# Patient Record
Sex: MALE | Race: WHITE | HISPANIC OR LATINO | Employment: STUDENT | URBAN - METROPOLITAN AREA
[De-identification: names, ages, dates, MRNs, and addresses within clinical notes are randomized per-mention and may not be internally consistent; named-entity substitution may affect disease eponyms.]

---

## 2024-01-31 ENCOUNTER — HOSPITAL ENCOUNTER (OUTPATIENT)
Dept: RADIOLOGY | Facility: MEDICAL CENTER | Age: 24
End: 2024-01-31
Attending: PHYSICIAN ASSISTANT
Payer: COMMERCIAL

## 2024-01-31 ENCOUNTER — OFFICE VISIT (OUTPATIENT)
Dept: URGENT CARE | Facility: PHYSICIAN GROUP | Age: 24
End: 2024-01-31
Payer: COMMERCIAL

## 2024-01-31 ENCOUNTER — HOSPITAL ENCOUNTER (INPATIENT)
Facility: MEDICAL CENTER | Age: 24
LOS: 5 days | DRG: 253 | End: 2024-02-05
Attending: EMERGENCY MEDICINE | Admitting: STUDENT IN AN ORGANIZED HEALTH CARE EDUCATION/TRAINING PROGRAM
Payer: COMMERCIAL

## 2024-01-31 VITALS
HEART RATE: 72 BPM | WEIGHT: 154.1 LBS | TEMPERATURE: 97.6 F | SYSTOLIC BLOOD PRESSURE: 118 MMHG | RESPIRATION RATE: 16 BRPM | BODY MASS INDEX: 25.67 KG/M2 | HEIGHT: 65 IN | OXYGEN SATURATION: 98 % | DIASTOLIC BLOOD PRESSURE: 68 MMHG

## 2024-01-31 DIAGNOSIS — I82.622 ARM DVT (DEEP VENOUS THROMBOEMBOLISM), ACUTE, LEFT (HCC): ICD-10-CM

## 2024-01-31 DIAGNOSIS — G89.18 POSTOPERATIVE PAIN: ICD-10-CM

## 2024-01-31 DIAGNOSIS — M79.89 LEFT ARM SWELLING: ICD-10-CM

## 2024-01-31 DIAGNOSIS — I87.1 VENOUS THORACIC OUTLET SYNDROME OF LEFT SUBCLAVIAN VEIN: ICD-10-CM

## 2024-01-31 DIAGNOSIS — I82.A12 ACUTE DEEP VEIN THROMBOSIS (DVT) OF AXILLARY VEIN OF LEFT UPPER EXTREMITY (HCC): ICD-10-CM

## 2024-01-31 DIAGNOSIS — G54.0 THORACIC OUTLET SYNDROME: ICD-10-CM

## 2024-01-31 LAB
ALBUMIN SERPL BCP-MCNC: 4.5 G/DL (ref 3.2–4.9)
ALBUMIN/GLOB SERPL: 1.3 G/DL
ALP SERPL-CCNC: 66 U/L (ref 30–99)
ALT SERPL-CCNC: 17 U/L (ref 2–50)
ANION GAP SERPL CALC-SCNC: 14 MMOL/L (ref 7–16)
APTT PPP: 39.9 SEC (ref 24.7–36)
AST SERPL-CCNC: 17 U/L (ref 12–45)
BASOPHILS # BLD AUTO: 0.5 % (ref 0–1.8)
BASOPHILS # BLD: 0.04 K/UL (ref 0–0.12)
BILIRUB SERPL-MCNC: 0.5 MG/DL (ref 0.1–1.5)
BUN SERPL-MCNC: 13 MG/DL (ref 8–22)
CALCIUM ALBUM COR SERPL-MCNC: 9.3 MG/DL (ref 8.5–10.5)
CALCIUM SERPL-MCNC: 9.7 MG/DL (ref 8.5–10.5)
CHLORIDE SERPL-SCNC: 101 MMOL/L (ref 96–112)
CO2 SERPL-SCNC: 24 MMOL/L (ref 20–33)
CREAT SERPL-MCNC: 1.13 MG/DL (ref 0.5–1.4)
EOSINOPHIL # BLD AUTO: 0.11 K/UL (ref 0–0.51)
EOSINOPHIL NFR BLD: 1.3 % (ref 0–6.9)
ERYTHROCYTE [DISTWIDTH] IN BLOOD BY AUTOMATED COUNT: 39.7 FL (ref 35.9–50)
GFR SERPLBLD CREATININE-BSD FMLA CKD-EPI: 93 ML/MIN/1.73 M 2
GLOBULIN SER CALC-MCNC: 3.5 G/DL (ref 1.9–3.5)
GLUCOSE SERPL-MCNC: 130 MG/DL (ref 65–99)
HCT VFR BLD AUTO: 47.6 % (ref 42–52)
HGB BLD-MCNC: 16.3 G/DL (ref 14–18)
IMM GRANULOCYTES # BLD AUTO: 0.03 K/UL (ref 0–0.11)
IMM GRANULOCYTES NFR BLD AUTO: 0.3 % (ref 0–0.9)
INR PPP: 1.06 (ref 0.87–1.13)
LYMPHOCYTES # BLD AUTO: 1.33 K/UL (ref 1–4.8)
LYMPHOCYTES NFR BLD: 15.2 % (ref 22–41)
MCH RBC QN AUTO: 28.8 PG (ref 27–33)
MCHC RBC AUTO-ENTMCNC: 34.2 G/DL (ref 32.3–36.5)
MCV RBC AUTO: 84.1 FL (ref 81.4–97.8)
MONOCYTES # BLD AUTO: 0.62 K/UL (ref 0–0.85)
MONOCYTES NFR BLD AUTO: 7.1 % (ref 0–13.4)
NEUTROPHILS # BLD AUTO: 6.63 K/UL (ref 1.82–7.42)
NEUTROPHILS NFR BLD: 75.6 % (ref 44–72)
NRBC # BLD AUTO: 0 K/UL
NRBC BLD-RTO: 0 /100 WBC (ref 0–0.2)
PLATELET # BLD AUTO: 301 K/UL (ref 164–446)
PMV BLD AUTO: 9.2 FL (ref 9–12.9)
POTASSIUM SERPL-SCNC: 3.7 MMOL/L (ref 3.6–5.5)
PROT SERPL-MCNC: 8 G/DL (ref 6–8.2)
PROTHROMBIN TIME: 13.9 SEC (ref 12–14.6)
RBC # BLD AUTO: 5.66 M/UL (ref 4.7–6.1)
SODIUM SERPL-SCNC: 139 MMOL/L (ref 135–145)
UFH PPP CHRO-ACNC: <0.1 IU/ML
WBC # BLD AUTO: 8.8 K/UL (ref 4.8–10.8)

## 2024-01-31 PROCEDURE — 80053 COMPREHEN METABOLIC PANEL: CPT

## 2024-01-31 PROCEDURE — 85025 COMPLETE CBC W/AUTO DIFF WBC: CPT

## 2024-01-31 PROCEDURE — 85613 RUSSELL VIPER VENOM DILUTED: CPT | Mod: 91

## 2024-01-31 PROCEDURE — 93971 EXTREMITY STUDY: CPT | Mod: LT

## 2024-01-31 PROCEDURE — 85670 THROMBIN TIME PLASMA: CPT | Mod: 91

## 2024-01-31 PROCEDURE — 85598 HEXAGNAL PHOSPH PLTLT NEUTRL: CPT

## 2024-01-31 PROCEDURE — 86146 BETA-2 GLYCOPROTEIN ANTIBODY: CPT

## 2024-01-31 PROCEDURE — 85732 THROMBOPLASTIN TIME PARTIAL: CPT

## 2024-01-31 PROCEDURE — 770020 HCHG ROOM/CARE - TELE (206)

## 2024-01-31 PROCEDURE — 85306 CLOT INHIBIT PROT S FREE: CPT

## 2024-01-31 PROCEDURE — 85610 PROTHROMBIN TIME: CPT

## 2024-01-31 PROCEDURE — 81241 F5 GENE: CPT

## 2024-01-31 PROCEDURE — 86147 CARDIOLIPIN ANTIBODY EA IG: CPT | Mod: 91

## 2024-01-31 PROCEDURE — 85730 THROMBOPLASTIN TIME PARTIAL: CPT

## 2024-01-31 PROCEDURE — 85520 HEPARIN ASSAY: CPT

## 2024-01-31 PROCEDURE — 700111 HCHG RX REV CODE 636 W/ 250 OVERRIDE (IP): Performed by: EMERGENCY MEDICINE

## 2024-01-31 PROCEDURE — 96365 THER/PROPH/DIAG IV INF INIT: CPT

## 2024-01-31 PROCEDURE — 81240 F2 GENE: CPT

## 2024-01-31 PROCEDURE — 3078F DIAST BP <80 MM HG: CPT | Performed by: PHYSICIAN ASSISTANT

## 2024-01-31 PROCEDURE — 36415 COLL VENOUS BLD VENIPUNCTURE: CPT

## 2024-01-31 PROCEDURE — 99205 OFFICE O/P NEW HI 60 MIN: CPT | Performed by: PHYSICIAN ASSISTANT

## 2024-01-31 PROCEDURE — 3074F SYST BP LT 130 MM HG: CPT | Performed by: PHYSICIAN ASSISTANT

## 2024-01-31 PROCEDURE — 96375 TX/PRO/DX INJ NEW DRUG ADDON: CPT

## 2024-01-31 PROCEDURE — 85303 CLOT INHIBIT PROT C ACTIVITY: CPT

## 2024-01-31 PROCEDURE — 99223 1ST HOSP IP/OBS HIGH 75: CPT | Mod: GC | Performed by: STUDENT IN AN ORGANIZED HEALTH CARE EDUCATION/TRAINING PROGRAM

## 2024-01-31 PROCEDURE — 99285 EMERGENCY DEPT VISIT HI MDM: CPT

## 2024-01-31 RX ORDER — HEPARIN SODIUM 5000 [USP'U]/100ML
0-30 INJECTION, SOLUTION INTRAVENOUS CONTINUOUS
Status: DISCONTINUED | OUTPATIENT
Start: 2024-01-31 | End: 2024-02-02

## 2024-01-31 RX ORDER — HEPARIN SODIUM 1000 [USP'U]/ML
80 INJECTION, SOLUTION INTRAVENOUS; SUBCUTANEOUS ONCE
Status: COMPLETED | OUTPATIENT
Start: 2024-01-31 | End: 2024-01-31

## 2024-01-31 RX ORDER — HEPARIN SODIUM 1000 [USP'U]/ML
40 INJECTION, SOLUTION INTRAVENOUS; SUBCUTANEOUS PRN
Status: DISCONTINUED | OUTPATIENT
Start: 2024-01-31 | End: 2024-02-02

## 2024-01-31 RX ADMIN — HEPARIN SODIUM 18 UNITS/KG/HR: 5000 INJECTION, SOLUTION INTRAVENOUS at 19:21

## 2024-01-31 RX ADMIN — HEPARIN SODIUM 5500 UNITS: 1000 INJECTION, SOLUTION INTRAVENOUS; SUBCUTANEOUS at 19:19

## 2024-01-31 ASSESSMENT — ENCOUNTER SYMPTOMS
SHORTNESS OF BREATH: 0
SHORTNESS OF BREATH: 0
PALPITATIONS: 0
COUGH: 0
DIARRHEA: 0
WEAKNESS: 0
FOCAL WEAKNESS: 0
DIZZINESS: 0
WEIGHT LOSS: 0
CHILLS: 0
ORTHOPNEA: 0
MYALGIAS: 0
TREMORS: 0
PALPITATIONS: 0
TINGLING: 0
SORE THROAT: 0
PSYCHIATRIC NEGATIVE: 1
HEMOPTYSIS: 0
SPUTUM PRODUCTION: 0
HEADACHES: 0
DIAPHORESIS: 0
DIZZINESS: 0
CHILLS: 0
CLAUDICATION: 0
BRUISES/BLEEDS EASILY: 0
FEVER: 0
NAUSEA: 0
ABDOMINAL PAIN: 0
FEVER: 0
MYALGIAS: 1
VOMITING: 0
SENSORY CHANGE: 0
NECK PAIN: 0
SINUS PAIN: 0
BLOOD IN STOOL: 0
TINGLING: 0
BLURRED VISION: 0

## 2024-01-31 ASSESSMENT — COGNITIVE AND FUNCTIONAL STATUS - GENERAL
DAILY ACTIVITIY SCORE: 24
SUGGESTED CMS G CODE MODIFIER DAILY ACTIVITY: CH
SUGGESTED CMS G CODE MODIFIER MOBILITY: CH
MOBILITY SCORE: 24

## 2024-01-31 ASSESSMENT — PATIENT HEALTH QUESTIONNAIRE - PHQ9
1. LITTLE INTEREST OR PLEASURE IN DOING THINGS: NOT AT ALL
2. FEELING DOWN, DEPRESSED, IRRITABLE, OR HOPELESS: NOT AT ALL
SUM OF ALL RESPONSES TO PHQ9 QUESTIONS 1 AND 2: 0

## 2024-01-31 ASSESSMENT — PAIN DESCRIPTION - PAIN TYPE
TYPE: ACUTE PAIN
TYPE: ACUTE PAIN

## 2024-01-31 ASSESSMENT — LIFESTYLE VARIABLES
ALCOHOL_USE: NO
AVERAGE NUMBER OF DAYS PER WEEK YOU HAVE A DRINK CONTAINING ALCOHOL: 0
CONSUMPTION TOTAL: NEGATIVE
TOTAL SCORE: 0
TOTAL SCORE: 0
ON A TYPICAL DAY WHEN YOU DRINK ALCOHOL HOW MANY DRINKS DO YOU HAVE: 0
EVER HAD A DRINK FIRST THING IN THE MORNING TO STEADY YOUR NERVES TO GET RID OF A HANGOVER: NO
EVER FELT BAD OR GUILTY ABOUT YOUR DRINKING: NO
HAVE PEOPLE ANNOYED YOU BY CRITICIZING YOUR DRINKING: NO
TOTAL SCORE: 0
HAVE YOU EVER FELT YOU SHOULD CUT DOWN ON YOUR DRINKING: NO
HOW MANY TIMES IN THE PAST YEAR HAVE YOU HAD 5 OR MORE DRINKS IN A DAY: 0
DOES PATIENT WANT TO STOP DRINKING: NO

## 2024-01-31 ASSESSMENT — FIBROSIS 4 INDEX: FIB4 SCORE: 0.32

## 2024-01-31 NOTE — PROGRESS NOTES
Subjective:     CHIEF COMPLAINT  Chief Complaint   Patient presents with    Arm Pain     Since Saturday he has been having pain in his left arm, is swollen, color difference, pain under his bicep to his arm pit, hurts to raise arm all the way,tender to the touch, hot, does lift weights, (mom did mentioned that father side of the family hx of blood clots)        ANN Storey is a very pleasant 23 y.o. male who presents to the clinic with atraumatic left arm pain x 5 days.  Patient states he has noted intermittent swelling of his left arm over the last 5 days.  Has also noted his left arm is slightly purple in color as compared to his right arm.  Experiencing a deep aching pain to the left brachial region.  Pain aggravated with overhead activity.  Denies any numbness or tingling distally.  Denies any associated headaches or dizziness.  States father has a history of blood clots.  Patient has never personally had a blood clot.  No recent surgery or travel.  No daily medications.  Denies any chest pain or shortness of breath.    REVIEW OF SYSTEMS  Review of Systems   Constitutional:  Negative for chills and fever.   Respiratory:  Negative for shortness of breath.    Cardiovascular:  Negative for chest pain and palpitations.   Musculoskeletal:  Positive for myalgias. Negative for neck pain.   Skin:         Purple discoloration to left arm.   Neurological:  Negative for dizziness, tingling, weakness and headaches.       PAST MEDICAL HISTORY  There are no problems to display for this patient.      SURGICAL HISTORY  patient denies any surgical history    ALLERGIES  Not on File    CURRENT MEDICATIONS  Home Medications       Reviewed by Rajiv Rivera P.A.-C. (Physician Assistant) on 01/31/24 at 1343  Med List Status: <None>     Medication Last Dose Status        Patient Onofre Taking any Medications                           SOCIAL HISTORY  Social History     Tobacco Use    Smoking status: Never    Smokeless  "tobacco: Never   Substance and Sexual Activity    Alcohol use: Not on file    Drug use: Not on file    Sexual activity: Not on file       FAMILY HISTORY  History reviewed. No pertinent family history.       Objective:     VITAL SIGNS: /68 (BP Location: Left arm, Patient Position: Sitting, BP Cuff Size: Adult)   Pulse 72   Temp 36.4 °C (97.6 °F) (Temporal)   Resp 16   Ht 1.651 m (5' 5\")   Wt 69.9 kg (154 lb 1.6 oz)   SpO2 98%   BMI 25.64 kg/m²     PHYSICAL EXAM  Physical Exam  Constitutional:       General: He is not in acute distress.     Appearance: Normal appearance. He is not ill-appearing, toxic-appearing or diaphoretic.   HENT:      Head: Normocephalic and atraumatic.   Eyes:      Conjunctiva/sclera: Conjunctivae normal.   Cardiovascular:      Rate and Rhythm: Normal rate and regular rhythm.      Pulses: Normal pulses. No decreased pulses.           Radial pulses are 2+ on the right side and 2+ on the left side.      Heart sounds: Normal heart sounds. No murmur heard.  Pulmonary:      Effort: Pulmonary effort is normal.   Musculoskeletal:      Cervical back: Normal range of motion.      Comments: Left arm: Left arm appears mildly edematous as compared to the right arm.  There is also a slightly purple discoloration present to left arm.  Capillary refill less than 2 seconds to all digits.  Radial pulses are palpable and 2+ bilaterally.  Mild tenderness to palpation deep over the brachial artery and vein.  Maintains full shoulder range of motion.  Full cervical range of motion.   Skin:     Capillary Refill: Capillary refill takes less than 2 seconds.   Neurological:      General: No focal deficit present.      Mental Status: He is alert and oriented to person, place, and time. Mental status is at baseline.       RADIOLOGY RESULTS   US-EXTREMITY VENOUS UPPER UNILAT LEFT    Result Date: 1/31/2024 1/31/2024 2:40 PM HISTORY/REASON FOR EXAM:  Swelling of Limb; Atraumatic left arm swelling and " discoloration.  Concern for potential DVT versus thoracic outlet syndrome versus subclavian steal syndrome TECHNIQUE/EXAM DESCRIPTION: Real-time sonography of the left upper extremity deep veins was performed with gray-scale graded compression, color and duplex Doppler. COMPARISON:  None. FINDINGS: REAL-TIME GRAY-SCALE IMAGING: Real-time gray-scale imaging reveals no evidence of focal wall thickening. COLOR AND DUPLEX DOPPLER IMAGING: Echogenic material in the subclavian, axillary, brachial and basilic veins with absent Doppler flow and abnormal response to compression.     1.  Extensive LEFT upper extremity DVT. 2.  Thrombosis of the basilic vein consistent with SVT. These findings were electronically conveyed to TAJ BAKER on 1/31/2024 3:20 PM.         Assessment/Plan:     1. Arm DVT (deep venous thromboembolism), acute, left (HCC)  US-EXTREMITY VENOUS UPPER UNILAT LEFT            MDM/Comments:    Very pleasant 23-year-old male presents to the clinic with left upper extremity pain, swelling and discoloration x 5 days.  No preceding injury or trauma.  On exam left arm is mildly edematous diffusely.  There is also a slight purple discoloration to the arm.  Radial pulses are 2+ and equal bilaterally.  Capillary refill less than 2 seconds.  Patient does demonstrate tenderness to palpation deep over the brachial artery and vein.  Maintains full shoulder range of motion.  Current differentials include but are not limited to DVT versus thoracic outlet syndrome versus subclavian steal syndrome.  We will send out for an outpatient ultrasound and I will follow-up once available.    Patient's left upper extremity ultrasound came back demonstrating a large DVT extending from his brachial vein to his subclavian vein.  Also superficial involvement of the basilic vein present.  Due to the extensive nature, acute onset and location we will have the patient follow-up in the ED for further management.  Patient may be a  candidate for thrombolytics as well as anticoagulation.  Transfer center was informed and the patient will arrive via personal vehicle.    Differential diagnosis, natural history, supportive care, and indications for immediate follow-up discussed. All questions answered. Patient agrees with the plan of care.    Follow-up as needed if symptoms worsen or fail to improve to PCP, Urgent care or Emergency Room.    I have personally reviewed prior external notes and test results pertinent to today's visit.  I have independently reviewed and interpreted all diagnostics ordered during this urgent care acute visit.   Discussed management options (risks,benefits, and alternatives to treatment). Pt expresses understanding and the treatment plan was agreed upon. Questions were encouraged and answered to pt's satisfaction.    Please note that this dictation was created using voice recognition software. I have made a reasonable attempt to correct obvious errors, but I expect that there are errors of grammar and possibly content that I did not discover before finalizing the note.

## 2024-02-01 ENCOUNTER — APPOINTMENT (OUTPATIENT)
Dept: RADIOLOGY | Facility: MEDICAL CENTER | Age: 24
DRG: 253 | End: 2024-02-01
Attending: SURGERY
Payer: COMMERCIAL

## 2024-02-01 ENCOUNTER — APPOINTMENT (OUTPATIENT)
Dept: RADIOLOGY | Facility: MEDICAL CENTER | Age: 24
DRG: 253 | End: 2024-02-01
Payer: COMMERCIAL

## 2024-02-01 LAB
ANION GAP SERPL CALC-SCNC: 14 MMOL/L (ref 7–16)
BUN SERPL-MCNC: 12 MG/DL (ref 8–22)
CALCIUM SERPL-MCNC: 9.3 MG/DL (ref 8.5–10.5)
CFT BLD TEG: 11.8 MIN (ref 4.6–9.1)
CFT P HPASE BLD TEG: 8.1 MIN (ref 4.3–8.3)
CHLORIDE SERPL-SCNC: 103 MMOL/L (ref 96–112)
CLOT ANGLE BLD TEG: 44.8 DEGREES (ref 63–78)
CLOT LYSIS 30M P MA LENFR BLD TEG: 0.2 % (ref 0–2.6)
CO2 SERPL-SCNC: 25 MMOL/L (ref 20–33)
CREAT SERPL-MCNC: 1.04 MG/DL (ref 0.5–1.4)
CT.EXTRINSIC BLD ROTEM: >5 MIN (ref 0.8–2.1)
ERYTHROCYTE [DISTWIDTH] IN BLOOD BY AUTOMATED COUNT: 39.3 FL (ref 35.9–50)
GFR SERPLBLD CREATININE-BSD FMLA CKD-EPI: 103 ML/MIN/1.73 M 2
GLUCOSE SERPL-MCNC: 97 MG/DL (ref 65–99)
HCT VFR BLD AUTO: 47.8 % (ref 42–52)
HGB BLD-MCNC: 16.7 G/DL (ref 14–18)
MCF BLD TEG: 47.3 MM (ref 52–69)
MCF.PLATELET INHIB BLD ROTEM: 19.5 MM (ref 15–32)
MCH RBC QN AUTO: 29.2 PG (ref 27–33)
MCHC RBC AUTO-ENTMCNC: 34.9 G/DL (ref 32.3–36.5)
MCV RBC AUTO: 83.7 FL (ref 81.4–97.8)
PA AA BLD-ACNC: 7 % (ref 0–11)
PA ADP BLD-ACNC: 0.6 % (ref 0–17)
PLATELET # BLD AUTO: 320 K/UL (ref 164–446)
PMV BLD AUTO: 10 FL (ref 9–12.9)
POTASSIUM SERPL-SCNC: 3.5 MMOL/L (ref 3.6–5.5)
RBC # BLD AUTO: 5.71 M/UL (ref 4.7–6.1)
SODIUM SERPL-SCNC: 142 MMOL/L (ref 135–145)
TEG ALGORITHM TGALG: ABNORMAL
UFH PPP CHRO-ACNC: 0.42 IU/ML
UFH PPP CHRO-ACNC: 0.45 IU/ML
WBC # BLD AUTO: 7.9 K/UL (ref 4.8–10.8)

## 2024-02-01 PROCEDURE — 85520 HEPARIN ASSAY: CPT

## 2024-02-01 PROCEDURE — 770020 HCHG ROOM/CARE - TELE (206)

## 2024-02-01 PROCEDURE — A9270 NON-COVERED ITEM OR SERVICE: HCPCS

## 2024-02-01 PROCEDURE — 85027 COMPLETE CBC AUTOMATED: CPT

## 2024-02-01 PROCEDURE — A9270 NON-COVERED ITEM OR SERVICE: HCPCS | Mod: JZ

## 2024-02-01 PROCEDURE — 85384 FIBRINOGEN ACTIVITY: CPT

## 2024-02-01 PROCEDURE — 85576 BLOOD PLATELET AGGREGATION: CPT | Mod: 91

## 2024-02-01 PROCEDURE — 71045 X-RAY EXAM CHEST 1 VIEW: CPT

## 2024-02-01 PROCEDURE — 700102 HCHG RX REV CODE 250 W/ 637 OVERRIDE(OP)

## 2024-02-01 PROCEDURE — 700111 HCHG RX REV CODE 636 W/ 250 OVERRIDE (IP): Performed by: EMERGENCY MEDICINE

## 2024-02-01 PROCEDURE — 99233 SBSQ HOSP IP/OBS HIGH 50: CPT | Mod: GC | Performed by: HOSPITALIST

## 2024-02-01 PROCEDURE — 700102 HCHG RX REV CODE 250 W/ 637 OVERRIDE(OP): Mod: JZ

## 2024-02-01 PROCEDURE — 700117 HCHG RX CONTRAST REV CODE 255: Performed by: SURGERY

## 2024-02-01 PROCEDURE — 80048 BASIC METABOLIC PNL TOTAL CA: CPT

## 2024-02-01 PROCEDURE — 36415 COLL VENOUS BLD VENIPUNCTURE: CPT

## 2024-02-01 PROCEDURE — 99221 1ST HOSP IP/OBS SF/LOW 40: CPT | Performed by: SURGERY

## 2024-02-01 PROCEDURE — 71275 CT ANGIOGRAPHY CHEST: CPT

## 2024-02-01 PROCEDURE — 85347 COAGULATION TIME ACTIVATED: CPT

## 2024-02-01 RX ORDER — POTASSIUM CHLORIDE 20 MEQ/1
40 TABLET, EXTENDED RELEASE ORAL 2 TIMES DAILY
Status: COMPLETED | OUTPATIENT
Start: 2024-02-01 | End: 2024-02-01

## 2024-02-01 RX ORDER — HYDROMORPHONE HYDROCHLORIDE 1 MG/ML
0.5 INJECTION, SOLUTION INTRAMUSCULAR; INTRAVENOUS; SUBCUTANEOUS
Status: DISCONTINUED | OUTPATIENT
Start: 2024-02-01 | End: 2024-02-02

## 2024-02-01 RX ORDER — ACETAMINOPHEN 500 MG
1000 TABLET ORAL EVERY 6 HOURS
Status: DISCONTINUED | OUTPATIENT
Start: 2024-02-01 | End: 2024-02-02

## 2024-02-01 RX ORDER — OXYCODONE HYDROCHLORIDE 5 MG/1
5 TABLET ORAL
Status: DISCONTINUED | OUTPATIENT
Start: 2024-02-01 | End: 2024-02-02

## 2024-02-01 RX ORDER — ACETAMINOPHEN 500 MG
1000 TABLET ORAL EVERY 6 HOURS PRN
Status: DISCONTINUED | OUTPATIENT
Start: 2024-02-06 | End: 2024-02-02

## 2024-02-01 RX ORDER — OXYCODONE HYDROCHLORIDE 10 MG/1
10 TABLET ORAL
Status: DISCONTINUED | OUTPATIENT
Start: 2024-02-01 | End: 2024-02-02

## 2024-02-01 RX ADMIN — ACETAMINOPHEN 1000 MG: 500 TABLET ORAL at 17:13

## 2024-02-01 RX ADMIN — POTASSIUM CHLORIDE 40 MEQ: 1500 TABLET, EXTENDED RELEASE ORAL at 17:13

## 2024-02-01 RX ADMIN — POTASSIUM CHLORIDE 40 MEQ: 1500 TABLET, EXTENDED RELEASE ORAL at 09:05

## 2024-02-01 RX ADMIN — IOHEXOL 90 ML: 350 INJECTION, SOLUTION INTRAVENOUS at 20:25

## 2024-02-01 RX ADMIN — ACETAMINOPHEN 1000 MG: 500 TABLET ORAL at 23:49

## 2024-02-01 RX ADMIN — HEPARIN SODIUM 18 UNITS/KG/HR: 5000 INJECTION, SOLUTION INTRAVENOUS at 15:24

## 2024-02-01 ASSESSMENT — ENCOUNTER SYMPTOMS
NAUSEA: 0
MYALGIAS: 0
BLURRED VISION: 0
HEMOPTYSIS: 0
DIARRHEA: 0
VOMITING: 0
NEUROLOGICAL NEGATIVE: 1
WEIGHT LOSS: 0
GASTROINTESTINAL NEGATIVE: 1
NECK PAIN: 0
PSYCHIATRIC NEGATIVE: 1
PALPITATIONS: 0
CHILLS: 0
CONSTIPATION: 0
BRUISES/BLEEDS EASILY: 0
ABDOMINAL PAIN: 0
FEVER: 0
DOUBLE VISION: 0
EYES NEGATIVE: 1
SHORTNESS OF BREATH: 0
COUGH: 0
HEARTBURN: 0

## 2024-02-01 ASSESSMENT — PAIN DESCRIPTION - PAIN TYPE
TYPE: ACUTE PAIN

## 2024-02-01 ASSESSMENT — FIBROSIS 4 INDEX: FIB4 SCORE: 0.3

## 2024-02-01 NOTE — PROGRESS NOTES
"  Progress note    HPI: Ha Storey is a 23 y.o. male admitted 2/1/2024 for worsening left arm swelling with mild pain in the setting of ultrasound positive for extensive left upper extremity deep vein thrombosis and superficial vein thrombosis of the basilic vein apparently stable.  Pertinent medical history includes \"clotting disorders\" on father side and weightlifting.  No apparent trauma, no hypertension, no immobility, no weight loss or any other symptoms that would suggest malignancy, no previous surgeries or PICC lines.  Admitted for higher level of care considering possible thrombectomy/thrombolysis and/or congenital coagulation disease.  Put on acute management with heparin, workup ordered.    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  Options include conservative therapy with simple anticoagulation and compression of the arm for indefinite time or surgical approach including venous thrombectomy with posterior first rib resection done during this admission.  Today, no acute changes.  Patient will discuss options with family and decide.  For now, will continue systemic anticoagulation and order thromboelastography.    Current Facility-Administered Medications:     Pharmacy Consult Request ...Pain Management Review 1 Each, 1 Each, Other, PHARMACY TO DOSE, Eileen Neely M.D.    acetaminophen (Tylenol) tablet 1,000 mg, 1,000 mg, Oral, Q6HRS **FOLLOWED BY** [START ON 2/6/2024] acetaminophen (Tylenol) tablet 1,000 mg, 1,000 mg, Oral, Q6HRS PRN, Eileen Neely M.D.    oxyCODONE immediate-release (Roxicodone) tablet 5 mg, 5 mg, Oral, Q3HRS PRN **OR** oxyCODONE immediate release (Roxicodone) tablet 10 mg, 10 mg, Oral, Q3HRS PRN **OR** HYDROmorphone (Dilaudid) injection 0.5 mg, 0.5 mg, Intravenous, Q3HRS PRN, Eileen Neely M.D.    potassium chloride SA (Kdur) tablet 40 mEq, 40 mEq, Oral, BID, Nico GARRETT" Reyes Yparraguirre, M.D.    heparin infusion 25,000 units in 500 mL 0.45% NACL, 0-30 Units/kg/hr, Intravenous, Continuous, Leigh Ann Hernandez M.D., Last Rate: 24.9 mL/hr at 02/01/24 0704, 18 Units/kg/hr at 02/01/24 0704    heparin injection 2,800 Units, 40 Units/kg, Intravenous, PRN, Leigh Ann Hernandez M.D.   Patient has no known allergies.     Vitals:    02/01/24 0122 02/01/24 0439 02/01/24 0529 02/01/24 0745   BP: 126/76 107/67  109/65   Pulse: 100 64  65   Resp: 18 17  18   Temp: 36.6 °C (97.9 °F) 36.5 °C (97.7 °F)  36.5 °C (97.7 °F)   TempSrc: Temporal Temporal  Temporal   SpO2: 97% 96%  96%   Weight:   67.7 kg (149 lb 4 oz)    Height:         Body mass index is 24.84 kg/m².    Review of Systems   Constitutional:  Negative for chills, fever and weight loss.   HENT: Negative.  Negative for hearing loss.    Eyes: Negative.  Negative for blurred vision and double vision.   Respiratory:  Negative for cough and hemoptysis.    Cardiovascular:  Negative for chest pain and palpitations.   Gastrointestinal: Negative.  Negative for heartburn.   Genitourinary: Negative.  Negative for dysuria.   Musculoskeletal:  Negative for myalgias and neck pain.   Skin: Negative.  Negative for itching and rash.   Neurological: Negative.    Endo/Heme/Allergies:  Does not bruise/bleed easily.   Psychiatric/Behavioral: Negative.        Physical Exam  Vitals and nursing note reviewed.   Constitutional:       Appearance: He is normal weight.   Cardiovascular:      Rate and Rhythm: Normal rate and regular rhythm.      Pulses: Normal pulses.      Heart sounds: Normal heart sounds.   Pulmonary:      Effort: Pulmonary effort is normal.   Abdominal:      General: Bowel sounds are normal.   Musculoskeletal:         General: Normal range of motion.      Cervical back: Normal range of motion. No rigidity.      Comments: Mildly tender mass palpated in left axilla with no coloration changes in left upper extremity.   Lymphadenopathy:      Cervical: No  cervical adenopathy.   Skin:     General: Skin is warm.      Capillary Refill: Capillary refill takes less than 2 seconds.   Neurological:      Mental Status: He is alert. Mental status is at baseline.   Psychiatric:         Mood and Affect: Mood normal.         Behavior: Behavior normal.         Thought Content: Thought content normal.         Judgment: Judgment normal.       LABS:   Recent Labs     24  1743 24  0125   WBC 8.8 7.9   RBC 5.66 5.71   HEMOGLOBIN 16.3 16.7   HEMATOCRIT 47.6 47.8   MCV 84.1 83.7   MCH 28.8 29.2   MCHC 34.2 34.9   RDW 39.7 39.3   PLATELETCT 301 320   MPV 9.2 10.0      Recent Labs     24  1743 24  0125   SODIUM 139 142   POTASSIUM 3.7 3.5*   CHLORIDE 101 103   CO2 24 25   GLUCOSE 130* 97   BUN 13 12   CREATININE 1.13 1.04   CALCIUM 9.7 9.3      Recent Labs     24  1743 24  0125   ALTSGPT 17  --    ASTSGOT 17  --    ALKPHOSPHAT 66  --    TBILIRUBIN 0.5  --    GLUCOSE 130* 97      RADIOLOGY:   No orders to display      MEDICAL DECISION MAKIN-year-old left handed male admitted 2024 for worsening left arm swelling with mild pain in axilla in the setting of apparently stable extensive left upper extremity deep vein thrombosis with superficial vein thrombosis of the basilic vein.  Pending workup for clotting disorders considering family history.  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  P patient will decide between conservative or surgical management.  Unable to anticipate discharge due to unclear intervention to be performed.    Disposition: Probably home, but will need to reassess.  Follow up outpatient with primary care physician, vascular surgery and other specialties already following.     ASSESSMENT AND PLAN:   * Extensive left upper extremity deep vein thrombosis with superficial vein thrombosis of basilic vein, unclear if nontraumatic or traumatic.   Pending workup for clotting disorders family history but favoring Paget Robles disease per vascular surgery recommendation.  Patient to decide between conservative treatment and surgery.  - Continue heparin drip  - Pending platelet mapping with basic thromboelastography  - Pending Antithrombin 3, heparin anti-Xa and lupus anticoagulant    * DVT prophylaxis with therapeutic heparin for main problem     Armando R. Reyes Yparraguirre, M.D.  Internal Medicine Resident   [facility]      This note was generated using voice recognition software which has a small chance of producing errors of grammar and possibly content. I have made every reasonable attempt to find and correct any obvious errors but expect that some may not be found prior to finalization of this note.

## 2024-02-01 NOTE — ED NOTES
Floor RN at bedside; verified rate of heparin drip; to floor via gurney; AOX4 GCS15 on room air; parents with patient. All belongings with patient

## 2024-02-01 NOTE — ED PROVIDER NOTES
"  ER Provider Note    Scribed for Leigh Ann Hernandez M.d. by Anh John. 1/31/2024  5:56 PM    Primary Care Provider: Pcp Pt States None    CHIEF COMPLAINT  Chief Complaint   Patient presents with    Sent from Urgent Care     Pt presented to  for \"warm/sharp\" L arm pain. Pt rates pain 4/10. Pt had US performed and found extensive DVT in TIANA.      LIMITATION TO HISTORY   Select: : None    HPI/ROS  OUTSIDE HISTORIAN(S):  None    EXTERNAL RECORDS REVIEWED  Hospital records reviewed showed an ultrasound done at Urgent Care today. This revealed echogenic materail in the subclavian, axillary, brachal and basilic veins with asent doppler flow and abnormal response to compression.     Ha Storey is a 23 y.o. male who presents to the ED for a a DVT in his left upper extremity. Starting on 1/27/24, the patient started to develop some pain under his left armpit with intermittent swelling. He rates his pain as a 4/10 in severity. When his left arm is down he does not have much pain but the pain is exacerbated when he moves his arm. Denies chest pain or shortness of breath. Patient was seen at Urgent Care today for left arm pain where an ultrasound found an extensive DVT in the left upper extremity. He adds that his dad's side of the family has a history of blood clots. Denies any trauma to the area, surgeries or periods of immobility. The patient does lift weights. Denies other medical problems.     PAST MEDICAL HISTORY  History reviewed. No pertinent past medical history.    SURGICAL HISTORY  History reviewed. No pertinent surgical history.    FAMILY HISTORY  History reviewed. No pertinent family history.    SOCIAL HISTORY   reports that he has never smoked. He has never used smokeless tobacco.    CURRENT MEDICATIONS  No current outpatient medications     ALLERGIES  Patient has no known allergies.    PHYSICAL EXAM  BP (!) 142/73   Pulse 76   Temp 36.7 °C (98 °F) (Temporal)   Resp 14   Ht 1.651 m (5' 5\")   Wt 69.3 " kg (152 lb 12.5 oz)   SpO2 97%   BMI 25.42 kg/m²     Constitutional: Alert in no apparent distress. Well apearing  HENT: Normocephalic, Atraumatic, Bilateral external ears normal. Nose normal.   Eyes:  Conjunctiva normal, non-icteric.   Lungs: Non-labored respirations, clear to auscultation bilaterally  Heart: Regular rate and rhythm no murmurs rubs or gallops  Skin: Warm, Dry, No erythema, No rash.   Neurologic: Alert, Grossly non-focal.   Psychiatric: Affect normal, Judgment normal, Mood normal, Appears appropriate and not intoxicated.   Extremities: Non pitting edema to the left upper extremity, Intact radial pulses.       DIAGNOSTIC STUDIES & PROCEDURES    Labs:   Results for orders placed or performed during the hospital encounter of 01/31/24   CBC WITH DIFFERENTIAL   Result Value Ref Range    WBC 8.8 4.8 - 10.8 K/uL    RBC 5.66 4.70 - 6.10 M/uL    Hemoglobin 16.3 14.0 - 18.0 g/dL    Hematocrit 47.6 42.0 - 52.0 %    MCV 84.1 81.4 - 97.8 fL    MCH 28.8 27.0 - 33.0 pg    MCHC 34.2 32.3 - 36.5 g/dL    RDW 39.7 35.9 - 50.0 fL    Platelet Count 301 164 - 446 K/uL    MPV 9.2 9.0 - 12.9 fL    Neutrophils-Polys 75.60 (H) 44.00 - 72.00 %    Lymphocytes 15.20 (L) 22.00 - 41.00 %    Monocytes 7.10 0.00 - 13.40 %    Eosinophils 1.30 0.00 - 6.90 %    Basophils 0.50 0.00 - 1.80 %    Immature Granulocytes 0.30 0.00 - 0.90 %    Nucleated RBC 0.00 0.00 - 0.20 /100 WBC    Neutrophils (Absolute) 6.63 1.82 - 7.42 K/uL    Lymphs (Absolute) 1.33 1.00 - 4.80 K/uL    Monos (Absolute) 0.62 0.00 - 0.85 K/uL    Eos (Absolute) 0.11 0.00 - 0.51 K/uL    Baso (Absolute) 0.04 0.00 - 0.12 K/uL    Immature Granulocytes (abs) 0.03 0.00 - 0.11 K/uL    NRBC (Absolute) 0.00 K/uL   COMP METABOLIC PANEL   Result Value Ref Range    Sodium 139 135 - 145 mmol/L    Potassium 3.7 3.6 - 5.5 mmol/L    Chloride 101 96 - 112 mmol/L    Co2 24 20 - 33 mmol/L    Anion Gap 14.0 7.0 - 16.0    Glucose 130 (H) 65 - 99 mg/dL    Bun 13 8 - 22 mg/dL    Creatinine  1.13 0.50 - 1.40 mg/dL    Calcium 9.7 8.5 - 10.5 mg/dL    Correct Calcium 9.3 8.5 - 10.5 mg/dL    AST(SGOT) 17 12 - 45 U/L    ALT(SGPT) 17 2 - 50 U/L    Alkaline Phosphatase 66 30 - 99 U/L    Total Bilirubin 0.5 0.1 - 1.5 mg/dL    Albumin 4.5 3.2 - 4.9 g/dL    Total Protein 8.0 6.0 - 8.2 g/dL    Globulin 3.5 1.9 - 3.5 g/dL    A-G Ratio 1.3 g/dL   APTT   Result Value Ref Range    APTT 39.9 (H) 24.7 - 36.0 sec   PROTHROMBIN TIME (INR)   Result Value Ref Range    PT 13.9 12.0 - 14.6 sec    INR 1.06 0.87 - 1.13   ESTIMATED GFR   Result Value Ref Range    GFR (CKD-EPI) 93 >60 mL/min/1.73 m 2      All labs reviewed by me.    COURSE & MEDICAL DECISION MAKING    ED Observation Status? No; Patient does not meet criteria for ED Observation.     5:56 PM - Patient seen and evaluated at bedside. Patient will be treated with heparin infusion 25, 000 units in 500 mL 0.45% NACL, heparin injection 2,800 units, heparin injection 5,5000 units for his symptoms. Ordered Prothrombin Time, CMP, APTT, and CBC w/ Diff,  to evaluate. He understands and agrees to the plan of care.     6:17 PM - Ordered Heparin Xa (Unfractionated), Antithrombin III Func/ Immunol, Anticardiolipin Antibody, Protein C Functional, Protein S Functional, Factor V Leiden Mutation, and Lupus Anticoagulant.     6:28 PM I discussed the patient's case and the above findings with Dr. Kaur (Vascular) who recommended that the patient be hospitalized and he will consult in the morning. He is concerned for thoracic outlet syndrome.     6:47 PM - I discussed the patient's case and the above findings with Dr. Quezada (Hospitalist) who agreed to hospitalize the patient. Patient's care was transferred at this time.     INITIAL ASSESSMENT AND PLAN  Care Narrative: This is a 23-year-old gentleman who presents for extensive DVT in the left upper extremity.  Patient has no tachycardia no hypoxia concerning for acute PE.  There is family history of DVTs it sounds like in the patient's  father's family.  Initially plan was to anticoagulate him with oral anticoagulants however on review review of his outpatient ultrasound he has such extensive disease that I did consult vascular surgery who recommended patient be hospitalized and he will likely plan on lysis of this and possible first rib removal as the patient likely has thoracic outlet syndrome.  Therefore patient will be started on heparin drip the hospitalist will admit the patient and patient will be hospitalized for definitive management of this.                     DISPOSITION AND DISCUSSIONS  I have discussed management of the patient with the following physicians and BABITA's: Dr. Kaur (Vascular), Dr. Quezada  (Hospitalist)    Discussion of management with other [facility] or appropriate source(s): None         DISPOSITION:  Patient will be hospitalized by Dr. Quezada (Hospitalist) in guarded condition.     FINAL IMPRESSION   1. Acute deep vein thrombosis (DVT) of axillary vein of left upper extremity (HCC)    2. Thoracic outlet syndrome      Anh VILLA (Larry), am scribing for, and in the presence of, Leigh Ann Hernandez M.D..    Electronically signed by: Anh John (Larry), 1/31/2024    ILeigh Ann M.D. personally performed the services described in this documentation, as scribed by Anh John in my presence, and it is both accurate and complete.    The note accurately reflects work and decisions made by me.  Leigh Ann Hernandez M.D.  1/31/2024  8:19 PM

## 2024-02-01 NOTE — CARE PLAN
The patient is Stable - Low risk of patient condition declining or worsening    Shift Goals  Clinical Goals: monitor heparin drip  Patient Goals: rest    Progress made toward(s) clinical / shift goals:    Problem: Knowledge Deficit - Standard  Goal: Patient and family/care givers will demonstrate understanding of plan of care, disease process/condition, diagnostic tests and medications  Outcome: Progressing  Note: Reviewed plan of care with patient.  Patient verbalizes understanding.      Problem: Risk for Bleeding  Goal: Patient will take measures to prevent bleeding and recognizes signs of bleeding that need to be reported immediately to a health care professional  Outcome: Progressing  Note: Education provided to patient regarding signs of bleeding that could arise from Heparin drip. Patient verbalized understanding of reporting any signs of bleeding to provider.

## 2024-02-01 NOTE — CONSULTS
Vascular Surgery          New Patient Consultation    Patient:Ha Storey  MRN:[id number]    Date: 2/1/2024    Referring Provider: SHIRLEY Trevizo M.d.    Consulting Physician: Venkata Kaur MD  _____________________________________________________    Reason for consultation:    Evaluate patient with left upper extremity deep vein thrombosis  HPI:  This is a 23 y.o. male who presented to the emergency room with a 4-day history of swelling of his left arm.  Patient reports some pain in the supraclavicular region earlier in the week.  The patient has had progressive swelling.  Patient underwent evaluation and has a left upper extremity deep vein thrombosis.  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    History reviewed. No pertinent past medical history.    History reviewed. No pertinent surgical history.    Current Facility-Administered Medications   Medication Dose Route Frequency Provider Last Rate Last Admin    Pharmacy Consult Request ...Pain Management Review 1 Each  1 Each Other PHARMACY TO DOSE Eileen Neely M.D.        acetaminophen (Tylenol) tablet 1,000 mg  1,000 mg Oral Q6HRS Eileen Neely M.D.        Followed by    [START ON 2/6/2024] acetaminophen (Tylenol) tablet 1,000 mg  1,000 mg Oral Q6HRS PRN Eileen Neely M.D.        oxyCODONE immediate-release (Roxicodone) tablet 5 mg  5 mg Oral Q3HRS PRTAYLOR Neely M.D.        Or    oxyCODONE immediate release (Roxicodone) tablet 10 mg  10 mg Oral Q3HRS PRTAYLOR Neely M.D.        Or    HYDROmorphone (Dilaudid) injection 0.5 mg  0.5 mg Intravenous Q3HRS PRN Eileen Neely M.D.        potassium chloride SA (Kdur) tablet 40 mEq  40 mEq Oral BID Armando R Reyes Yparraguirre, M.D.   40 mEq at 02/01/24 0905    heparin infusion 25,000 units in 500 mL 0.45% NACL  0-30 Units/kg/hr Intravenous Continuous Leigh Ann Hernandez M.D. 24.9 mL/hr at 02/01/24 0903 18 Units/kg/hr  "at 02/01/24 0903    heparin injection 2,800 Units  40 Units/kg Intravenous PRN Leigh Ann Hernandez M.D.           Social History     Socioeconomic History    Marital status: Single     Spouse name: Not on file    Number of children: Not on file    Years of education: Not on file    Highest education level: Not on file   Occupational History    Not on file   Tobacco Use    Smoking status: Never    Smokeless tobacco: Never   Substance and Sexual Activity    Alcohol use: Not on file    Drug use: Not on file    Sexual activity: Not on file   Other Topics Concern    Not on file   Social History Narrative    Not on file     Social Determinants of Health     Financial Resource Strain: Not on file   Food Insecurity: Not on file   Transportation Needs: Not on file   Physical Activity: Not on file   Stress: Not on file   Social Connections: Not on file   Intimate Partner Violence: Not on file   Housing Stability: Not on file       History reviewed. No pertinent family history.    Allergies:  Patient has no known allergies.    Review of Systems:    Constitutional: Negative for fever or chills  HENT:   Negative for hearing loss or tinnitus    Eyes:    Negative for blurred vision or loss of vision  Respiratory:  Negative for cough or hemoptysis  Cardiac:  Negative for chest pain or palpitations  Vascular:  Negative for claudication, Negative for rest pain  Gastrointestinal: Negative for vomiting or abdominal pain     Negative for hematochezia or melena   Genitourinary: Negative for dysuria or hematuria   Musculoskeletal: Left upper extremity swelling  Skin:   Negative for itching or rash  Neurological:  Negative for dizziness or headaches     Negative for speech disturbance     Negative for extremity weakness or paresthesias  Endo/Heme:  Negative for easy bruising or bleeding    Physical Exam:  /65   Pulse 65   Temp 36.5 °C (97.7 °F) (Temporal)   Resp 18   Ht 1.651 m (5' 5\")   Wt 67.7 kg (149 lb 4 oz)   SpO2 96%   BMI " 24.84 kg/m²     Constitutional: Alert, oriented, no acute distress  HEENT:  Normocephalic and atraumatic, EOMI  Neck:   Supple, no JVD,   Cardiovascular: Regular rate and rhythm,   Pulmonary:  Good air entry bilaterally,    Abdominal:  Soft, non-tender, non-distended         Musculoskeletal: Left upper extremity swelling neurological:  CN II-XII grossly intact, no focal deficits  Skin:   Skin is warm and dry. No rash noted.  Vascular exam:        Labs:  Recent Labs     01/31/24  1743 02/01/24  0125   WBC 8.8 7.9   RBC 5.66 5.71   HEMOGLOBIN 16.3 16.7   HEMATOCRIT 47.6 47.8   MCV 84.1 83.7   MCH 28.8 29.2   MCHC 34.2 34.9   RDW 39.7 39.3   PLATELETCT 301 320   MPV 9.2 10.0     Recent Labs     01/31/24  1743 02/01/24  0125   SODIUM 139 142   POTASSIUM 3.7 3.5*   CHLORIDE 101 103   CO2 24 25   GLUCOSE 130* 97   BUN 13 12   CREATININE 1.13 1.04   CALCIUM 9.7 9.3     Recent Labs     01/31/24  1743   APTT 39.9*   INR 1.06     Recent Labs     01/31/24  1743   ASTSGOT 17   ALTSGPT 17   TBILIRUBIN 0.5   ALKPHOSPHAT 66   GLOBULIN 3.5   INR 1.06         Radiology:  IMPRESSION:     1.  Extensive LEFT upper extremity DVT.  2.  Thrombosis of the basilic vein consistent with SVT.      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  This is most consistent with Paget Robles's disease.  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    I discussed the pathophysiology and natural history of venous thoracic outlet.  We discussed systemic anticoagulation and options with respect to management.  We discussed conservative therapy of simple anticoagulation and time with compression of the left arm.  We discussed venous thrombectomy with staged the first rib resection.  After going through all the options they are considering.  Will obtain a CTA to evaluate for a cervical rib or any other pathology in that region.  N.p.o. after midnight we will [place] back later to get an answer with respect to  their decision to move forward with thrombectomy and first rib resection versus conservative therapy.      Venkata Kaur MD  [facility]   [place] preferred or call my office [phone]  __________________________________________________________________  Patient:Ha Storey   MRN:[id number]   CSN:[id number]

## 2024-02-01 NOTE — PROGRESS NOTES
4 Eyes Skin Assessment Completed by MALENA Hogue and MALENA Hull.    Head WDL  Ears WDL  Nose WDL  Mouth WDL  Neck WDL  Breast/Chest WDL  Shoulder Blades WDL  Spine WDL  (R) Arm/Elbow/Hand WDL  (L) Arm/Elbow/Hand WDL  Abdomen WDL  Groin WDL  Scrotum/Coccyx/Buttocks WDL  (R) Leg WDL  (L) Leg WDL  (R) Heel/Foot/Toe WDL  (L) Heel/Foot/Toe WDL          Devices In Places Tele Box and Blood Pressure Cuff      Interventions In Place Pillows    Possible Skin Injury No    Pictures Uploaded Into Epic N/A  Wound Consult Placed N/A  RN Wound Prevention Protocol Ordered No

## 2024-02-01 NOTE — NON-PROVIDER
INTERNAL MEDICINE MEDICAL STUDENT PROGRESS NOTE          PATIENT ID:  NAME:  Ha Storey  MRN:               [id number]  YOB: 2000    Date of Admission: 1/31/2024     Attending: SHIRLEY Trevizo M.d.     STUDENT: Shemar Maher [facility]    Primary Care Physician:  Pcp Pt States None    HPI: Ha Storey is a 23 y.o. male admitted for extensive LUE DVT on hospital day 1. He has no prior medical history. He first noticed swelling on 1/27/2024, which progressed to the entire forearm with significant pain on the entire LUE and left chest. He reported tightness and reduced range of motion, but denied tingling, numbness, discoloration, skin changes, trauma or triggers. 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 He went to urgent care where LUE ES demonstrated extensive DVT and SVT of left basilic vein, and was sent to ED.    ED course demonstrated vitals WNL, unremarkable labs except for mildly elevated APTT at 39, with normal INR of 1.06. Hematoloy and Vascular Surgery consulted, recommended for admission and hypercoagulable workup, with vascular recs for thrombectomy/thrombolysis. Antiphospholipid, Prot C/S def, Factor V Leiden, prothrombin, ATIII workup still pending. IV heparin drip was started, patient kept NPO for surgery.    Interval Problem Update  NAEO, patient is reporting improved symptoms today, with a decrease in swelling and increase in ROM.  to palpation in axilla but left chest pain has improved. He denies use of any exercise supplements, anabolic steroid use and recreational drug use. Vascular assessment is most concerning for thoracic outlet syndrome secondary to anatomical variants causing primary upper extremity DVT. Vascular recs provide two options for patient, inpatient anticoagulation with rivaroxoban outpatient therapy, or inpatient heparin, thrombectomy then first rib resection to alleviate obstruction of neurovascular  bundle. Patient has elected for thrombectomy tomorrow, with rib resection on 2/3/2024.      ROS:  Review of Systems   Constitutional:  Negative for chills and fever.   Respiratory:  Negative for shortness of breath.    Cardiovascular:  Positive for chest pain. Negative for leg swelling.   Gastrointestinal:  Negative for abdominal pain, constipation, diarrhea, nausea and vomiting.   Genitourinary:  Negative for dysuria.   Musculoskeletal:         LUE pain, swelling, tightness, limited ROM   Skin:  Negative for rash.   Endo/Heme/Allergies:  Does not bruise/bleed easily.        OBJECTIVE:  Temp:  [36.4 °C (97.6 °F)-37.2 °C (99 °F)] 36.5 °C (97.7 °F)  Pulse:  [] 65  Resp:  [14-18] 18  BP: (107-142)/(65-77) 109/65  SpO2:  [95 %-98 %] 96 %      Intake/Output Summary (Last 24 hours) at 2/1/2024 1118  Last data filed at 2/1/2024 0800  Gross per 24 hour   Intake 335.64 ml   Output 0 ml   Net 335.64 ml       PHYSICAL EXAM:  Physical Exam  Constitutional:       General: He is not in acute distress.     Appearance: Normal appearance. He is normal weight. He is not ill-appearing.   Cardiovascular:      Rate and Rhythm: Normal rate and regular rhythm.      Pulses: Normal pulses.      Heart sounds: No murmur heard.     No friction rub. No gallop.   Pulmonary:      Breath sounds: Normal breath sounds. No wheezing, rhonchi or rales.   Chest:      Chest wall: Tenderness present.   Musculoskeletal:         General: Swelling and tenderness present.      Comments: LUE extremity/axilla tender to palpation, edema present   Skin:     General: Skin is warm and dry.   Neurological:      General: No focal deficit present.      Mental Status: He is alert and oriented to person, place, and time.   Psychiatric:         Mood and Affect: Mood normal.         Behavior: Behavior normal.         LABS:  Recent Labs     01/31/24  1743 02/01/24  0125   WBC 8.8 7.9   RBC 5.66 5.71   HEMOGLOBIN 16.3 16.7   HEMATOCRIT 47.6 47.8   MCV 84.1 83.7   MCH  28.8 29.2   RDW 39.7 39.3   PLATELETCT 301 320   MPV 9.2 10.0   NEUTSPOLYS 75.60*  --    LYMPHOCYTES 15.20*  --    MONOCYTES 7.10  --    EOSINOPHILS 1.30  --    BASOPHILS 0.50  --      Recent Labs     01/31/24  1743 02/01/24  0125   SODIUM 139 142   POTASSIUM 3.7 3.5*   CHLORIDE 101 103   CO2 24 25   BUN 13 12   CREATININE 1.13 1.04   CALCIUM 9.7 9.3   ALBUMIN 4.5  --      Estimated GFR/CRCL = Estimated Creatinine Clearance: 96.1 mL/min (by C-G formula based on SCr of 1.04 mg/dL).  Recent Labs     01/31/24 1743 02/01/24  0125   GLUCOSE 130* 97     Recent Labs     01/31/24 1743   ASTSGOT 17   ALTSGPT 17   TBILIRUBIN 0.5   ALKPHOSPHAT 66   GLOBULIN 3.5   INR 1.06             Recent Labs     01/31/24 1743   INR 1.06   APTT 39.9*         IMAGING:  CT-CTA CHEST WITH & W/O-POST PROCESS    (Results Pending)       CULTURES:   Results       ** No results found for the last 168 hours. **            MEDS:  Current Facility-Administered Medications   Medication Last Admin    Pharmacy Consult Request ...Pain Management Review 1 Each      acetaminophen (Tylenol) tablet 1,000 mg      Followed by    [START ON 2/6/2024] acetaminophen (Tylenol) tablet 1,000 mg      oxyCODONE immediate-release (Roxicodone) tablet 5 mg      Or    oxyCODONE immediate release (Roxicodone) tablet 10 mg      Or    HYDROmorphone (Dilaudid) injection 0.5 mg      potassium chloride SA (Kdur) tablet 40 mEq 40 mEq at 02/01/24 0905    heparin infusion 25,000 units in 500 mL 0.45% NACL 18 Units/kg/hr at 02/01/24 0903    heparin injection 2,800 Units         ASSESSMENT/PLAN:  23 y.o. male with no PMHx admitted for extensive, unprovoked LUE DVT electing for thrombectomy and surgical resection of first rib.    Left Upper Extremity DVT  Extensive LUE DVT confirmed by ultrasound, subclavian, axillary, brachial and basilic vein involvement. Patient has no provoking triggers but does have FHx (PGF and paternal aunt) of frequent blood clots. No symptoms of malignancy  as patient is well-built with no insidious features. Heparin drip has improved symptoms and patient has elected for thrombectomy and first rib resection. Per Vascular, presentation is classic for thoracic outlet syndrome caused by anatomical variance and patient's increased muscle mass, imaging will be ordered to evaluate anatomy and cervical rib. Possible that hypercoagulable pathology exacerbated/incited patient's DIANNA due to FHx, so workup will still be completed.  -Continue heparin drip   -NPO after midnight tonight for thrombectomy tomorrow  -TEG testing  -Hypercoagulable workup for antiphospholipid syndrome (WILDA, lupus anticoagulant, anti-Beta 2 glycoprotein), Prot C/S deficiency, Factor V Leiden, prothrombin mutation, ATIII pending  -Pain management with Tylenol and oxy PRN         Core Measures:  Fluids: None  Lines: PIV  Abx: None  Diet: Full diet, NPO after midnight  PPX: therapeutic heparin drip    CODE Status: Full Code      Disposition  Patient is not medically cleared for discharge.       Shemar Maher, MSIII  [employer]

## 2024-02-01 NOTE — ASSESSMENT & PLAN NOTE
New onset, extensive: subclavian, axillary, brachial and basilic veins. Unprovoked. No signs of PE or other compromise. Has family hx of clotting disorder on father's side (recurrent clots in father's sister and dad). No other symptoms suggestive of autoimmunity or malignancy. No prior symptoms on that arm until Saturday. Swelling stable, distal pulses and neuro exam normal.   - Vascular medicine on consult, plan for thrombectomy/thrombolysis tomorrow per ERP discussion with Dr. Kaur - some concern for TOS as well, official consult in AM  - Continue heparin drip   - Antiphospholipid syndrome w/u: WILDA and lupic anticoagulant pending, added anti-Beta 2 glycoprotein  - Protein C and S, factor V Leiden, prothrombin gene mut, and antithrombin III pending  - Monitor symptoms   - Pain regimen: tylenol and oxy  - Keep NPO until vascular's official consult

## 2024-02-01 NOTE — ED TRIAGE NOTES
"Chief Complaint   Patient presents with    Sent from Urgent Care     Pt presented to  for \"warm/sharp\" L arm pain. Pt rates pain 4/10. Pt had US performed and found extensive DVT in TIANA.          Pt ambulated to triage for above complaint.  Pt is AO x 4, follows commands, and responds appropriately to questions. Patient's breathing is unlabored and pain is currently 4/10 on the 0-10 pain scale.  Pt placed in lobby. Patient educated on triage process and encouraged to alert staff for any changes.      BP (!) 142/73   Pulse 76   Temp 36.7 °C (98 °F) (Temporal)   Resp 14   Ht 1.651 m (5' 5\")   Wt 69.3 kg (152 lb 12.5 oz)   SpO2 97%     "

## 2024-02-01 NOTE — H&P
"[facility] Internal Medicine History & Physical Note    Date of Service  2/1/2024    Attending: Dr. Quezada  Senior Resident: Dr. Neely  Contact Number: [phone]    Primary Care Physician  Pcp Pt States None    Consultants  None    Code Status  Full Code    Chief Complaint  Chief Complaint   Patient presents with    Sent from Urgent Care     Pt presented to  for \"warm/sharp\" L arm pain. Pt rates pain 4/10. Pt had US performed and found extensive DVT in TIANA.        History of Presenting Illness (HPI): Ha Storey is a 23 y.o. male without past medical history who presented 1/31/2024 with left arm swelling that started Saturday and his arm that then extended to the forearm and associated with significant pain in lung the whole left upper extremity with tightness sensation.  No tingling, numbness, discoloration or rashes.  No trauma to the region, or specific triggers. Patient exercises often and denies any prior symptoms on the arm until Saturday when he felt his \"arm was feeling weird and heavy\".    He went to an urgent care facility, where a LUE US demonstrated extensive LEFT upper extremity DVT and SVT of the basilic vein.   In the ER, vital signs within normal limits, saturating 97% on room air. Labs unremarkable, no thrombocytopenia. APTT 39 INR 1.06.  Hematology and vascular consulted in the ED. hematology recommends admission for workup and vascular is planning on thrombectomy/thrombolysis in a.m. per ED physician.     I discussed the plan of care with patient and family.    Review of Systems  Review of Systems   Constitutional:  Negative for chills, diaphoresis, fever, malaise/fatigue and weight loss.   HENT:  Negative for congestion, nosebleeds, sinus pain and sore throat.    Eyes:  Negative for blurred vision.   Respiratory:  Negative for cough, hemoptysis, sputum production and shortness of breath.    Cardiovascular:  Negative for chest pain, palpitations, orthopnea, claudication and leg swelling. "   Gastrointestinal:  Negative for abdominal pain, blood in stool, diarrhea, nausea and vomiting.   Genitourinary:  Negative for dysuria, hematuria and urgency.   Musculoskeletal:  Negative for joint pain and myalgias.   Skin:  Negative for itching and rash.   Neurological:  Negative for dizziness, tingling, tremors, sensory change and focal weakness.   Endo/Heme/Allergies:  Negative for environmental allergies. Does not bruise/bleed easily.   Psychiatric/Behavioral: Negative.         Past Medical History   has no past medical history on file.    Surgical History   has no past surgical history on file.     Family History  family history is not on file.   Family history reviewed with patient.     Social History  Tobacco: Denies  Alcohol: Socially  Recreational drugs (illegal or prescription): Denies  Employment: In college    Allergies  No Known Allergies    Medications  None       Physical Exam  Temp:  [36.4 °C (97.6 °F)-37.2 °C (99 °F)] 36.6 °C (97.9 °F)  Pulse:  [] 100  Resp:  [14-18] 18  BP: (118-142)/(68-77) 126/76  SpO2:  [95 %-98 %] 97 %  Blood Pressure: (!) 142/73   Temperature: 36.7 °C (98 °F)   Pulse: 76   Respiration: 14   Pulse Oximetry: 97 %       Physical Exam  Vitals reviewed.   Constitutional:       General: He is not in acute distress.     Appearance: Normal appearance. He is normal weight. He is not ill-appearing.   HENT:      Head: Normocephalic and atraumatic.      Nose: Nose normal.      Mouth/Throat:      Mouth: Mucous membranes are moist.      Pharynx: Oropharynx is clear.   Eyes:      Extraocular Movements: Extraocular movements intact.      Conjunctiva/sclera: Conjunctivae normal.      Pupils: Pupils are equal, round, and reactive to light.   Cardiovascular:      Rate and Rhythm: Normal rate and regular rhythm.      Pulses: Normal pulses.      Heart sounds: Normal heart sounds.   Pulmonary:      Effort: Pulmonary effort is normal.      Breath sounds: Normal breath sounds.   Abdominal:     "  General: Abdomen is flat. Bowel sounds are normal.      Palpations: Abdomen is soft.   Musculoskeletal:         General: Swelling (Tight swelling of left arm and forearm with significant tenderness to palpation in the volar aspect.  Distal pulses are intact, no discoloration, no weakness.) present. Normal range of motion.      Cervical back: Normal range of motion.   Skin:     General: Skin is warm.      Capillary Refill: Capillary refill takes less than 2 seconds.   Neurological:      General: No focal deficit present.      Mental Status: He is alert and oriented to person, place, and time. Mental status is at baseline.   Psychiatric:         Mood and Affect: Mood normal.         Laboratory:  Recent Labs     01/31/24  1743   WBC 8.8   RBC 5.66   HEMOGLOBIN 16.3   HEMATOCRIT 47.6   MCV 84.1   MCH 28.8   MCHC 34.2   RDW 39.7   PLATELETCT 301   MPV 9.2     Recent Labs     01/31/24  1743   SODIUM 139   POTASSIUM 3.7   CHLORIDE 101   CO2 24   GLUCOSE 130*   BUN 13   CREATININE 1.13   CALCIUM 9.7     Recent Labs     01/31/24  1743   ALTSGPT 17   ASTSGOT 17   ALKPHOSPHAT 66   TBILIRUBIN 0.5   GLUCOSE 130*     Recent Labs     01/31/24  1743   APTT 39.9*   INR 1.06     No results for input(s): \"NTPROBNP\" in the last 72 hours.      No results for input(s): \"TROPONINT\" in the last 72 hours.    Imaging:  No orders to display     Assessment/Plan:  Problem Representation:   Mr. Storey is a 23 yom without medical history who presents with an unprovoked extensive DVT of left arm. Admitted for vascular management and clotting disorder w/u.    I anticipate this patient will require at least two midnights for appropriate medical management, necessitating inpatient admission because vascular management and clotting disorder w/u    Patient will need a Telemetry bed on EMERGENCY service .  The need is secondary to vascular management and clotting disorder w/u.    * DVT of left axillary vein, acute (HCC)- (present on " admission)  Assessment & Plan  New onset, extensive: subclavian, axillary, brachial and basilic veins. Unprovoked. No signs of PE or other compromise. Has family hx of clotting disorder on father's side (recurrent clots in father's sister and dad). No other symptoms suggestive of autoimmunity or malignancy. No prior symptoms on that arm until Saturday. Swelling stable, distal pulses and neuro exam normal.   - Vascular medicine on consult, plan for thrombectomy/thrombolysis tomorrow per ERP discussion with Dr. Kaur - some concern for TOS as well, official consult in AM  - Continue heparin drip   - Antiphospholipid syndrome w/u: WILDA and lupic anticoagulant pending, added anti-Beta 2 glycoprotein  - Protein C and S, factor V Leiden, prothrombin gene mut, and antithrombin III pending  - Monitor symptoms   - Pain regimen: tylenol and oxy  - Keep NPO until vascular's official consult      VTE prophylaxis: therapeutic anticoagulation with heparin

## 2024-02-02 ENCOUNTER — APPOINTMENT (OUTPATIENT)
Dept: RADIOLOGY | Facility: MEDICAL CENTER | Age: 24
DRG: 253 | End: 2024-02-02
Attending: SURGERY
Payer: COMMERCIAL

## 2024-02-02 ENCOUNTER — ANESTHESIA EVENT (OUTPATIENT)
Dept: SURGERY | Facility: MEDICAL CENTER | Age: 24
DRG: 253 | End: 2024-02-02
Payer: COMMERCIAL

## 2024-02-02 ENCOUNTER — ANESTHESIA (OUTPATIENT)
Dept: SURGERY | Facility: MEDICAL CENTER | Age: 24
DRG: 253 | End: 2024-02-02
Payer: COMMERCIAL

## 2024-02-02 LAB
ABO + RH BLD: NORMAL
ABO GROUP BLD: NORMAL
APTT PPP: 232.3 SEC (ref 24.7–36)
BLD GP AB SCN SERPL QL: NORMAL
CARDIOLIPIN IGA SER IA-ACNC: <10 APL
CARDIOLIPIN IGG SER IA-ACNC: <10 GPL
CARDIOLIPIN IGM SER IA-ACNC: <10 MPL
EKG IMPRESSION: NORMAL
ERYTHROCYTE [DISTWIDTH] IN BLOOD BY AUTOMATED COUNT: 41.1 FL (ref 35.9–50)
HCT VFR BLD AUTO: 48.8 % (ref 42–52)
HGB BLD-MCNC: 16.3 G/DL (ref 14–18)
INR PPP: 1.15 (ref 0.87–1.13)
MCH RBC QN AUTO: 28.9 PG (ref 27–33)
MCHC RBC AUTO-ENTMCNC: 33.4 G/DL (ref 32.3–36.5)
MCV RBC AUTO: 86.5 FL (ref 81.4–97.8)
PLATELET # BLD AUTO: 282 K/UL (ref 164–446)
PMV BLD AUTO: 9.3 FL (ref 9–12.9)
PROT C ACT/NOR PPP: 119 % (ref 83–168)
PROT S ACT/NOR PPP: 143 % (ref 66–143)
PROTHROMBIN TIME: 14.8 SEC (ref 12–14.6)
RBC # BLD AUTO: 5.64 M/UL (ref 4.7–6.1)
RH BLD: NORMAL
UFH PPP CHRO-ACNC: 0.55 IU/ML
UFH PPP CHRO-ACNC: 1.06 IU/ML
WBC # BLD AUTO: 7.4 K/UL (ref 4.8–10.8)

## 2024-02-02 PROCEDURE — 502240 HCHG MISC OR SUPPLY RC 0272: Performed by: SURGERY

## 2024-02-02 PROCEDURE — 160002 HCHG RECOVERY MINUTES (STAT): Performed by: SURGERY

## 2024-02-02 PROCEDURE — 86901 BLOOD TYPING SEROLOGIC RH(D): CPT

## 2024-02-02 PROCEDURE — A9270 NON-COVERED ITEM OR SERVICE: HCPCS

## 2024-02-02 PROCEDURE — 160009 HCHG ANES TIME/MIN: Performed by: SURGERY

## 2024-02-02 PROCEDURE — 75774 ARTERY X-RAY EACH VESSEL: CPT | Mod: LT

## 2024-02-02 PROCEDURE — 86900 BLOOD TYPING SEROLOGIC ABO: CPT

## 2024-02-02 PROCEDURE — 05C83ZZ EXTIRPATION OF MATTER FROM LEFT AXILLARY VEIN, PERCUTANEOUS APPROACH: ICD-10-PCS | Performed by: SURGERY

## 2024-02-02 PROCEDURE — 36415 COLL VENOUS BLD VENIPUNCTURE: CPT

## 2024-02-02 PROCEDURE — 700105 HCHG RX REV CODE 258: Performed by: SURGERY

## 2024-02-02 PROCEDURE — 770001 HCHG ROOM/CARE - MED/SURG/GYN PRIV*

## 2024-02-02 PROCEDURE — 700101 HCHG RX REV CODE 250: Performed by: ANESTHESIOLOGY

## 2024-02-02 PROCEDURE — 700101 HCHG RX REV CODE 250: Performed by: SURGERY

## 2024-02-02 PROCEDURE — 160048 HCHG OR STATISTICAL LEVEL 1-5: Performed by: SURGERY

## 2024-02-02 PROCEDURE — 86850 RBC ANTIBODY SCREEN: CPT

## 2024-02-02 PROCEDURE — A9270 NON-COVERED ITEM OR SERVICE: HCPCS | Performed by: ANESTHESIOLOGY

## 2024-02-02 PROCEDURE — 85730 THROMBOPLASTIN TIME PARTIAL: CPT

## 2024-02-02 PROCEDURE — 0PB10ZZ EXCISION OF 1 TO 2 RIBS, OPEN APPROACH: ICD-10-PCS | Performed by: SURGERY

## 2024-02-02 PROCEDURE — 88300 SURGICAL PATH GROSS: CPT

## 2024-02-02 PROCEDURE — 700111 HCHG RX REV CODE 636 W/ 250 OVERRIDE (IP): Performed by: EMERGENCY MEDICINE

## 2024-02-02 PROCEDURE — C1757 CATH, THROMBECTOMY/EMBOLECT: HCPCS | Performed by: SURGERY

## 2024-02-02 PROCEDURE — 160041 HCHG SURGERY MINUTES - EA ADDL 1 MIN LEVEL 4: Performed by: SURGERY

## 2024-02-02 PROCEDURE — 700111 HCHG RX REV CODE 636 W/ 250 OVERRIDE (IP): Performed by: SURGERY

## 2024-02-02 PROCEDURE — C1887 CATHETER, GUIDING: HCPCS | Performed by: SURGERY

## 2024-02-02 PROCEDURE — 700102 HCHG RX REV CODE 250 W/ 637 OVERRIDE(OP): Performed by: SURGERY

## 2024-02-02 PROCEDURE — 700102 HCHG RX REV CODE 250 W/ 637 OVERRIDE(OP)

## 2024-02-02 PROCEDURE — 21615 EXCISION 1ST &/CERVICAL RIB: CPT | Performed by: SURGERY

## 2024-02-02 PROCEDURE — 700105 HCHG RX REV CODE 258: Performed by: ANESTHESIOLOGY

## 2024-02-02 PROCEDURE — 05763ZZ DILATION OF LEFT SUBCLAVIAN VEIN, PERCUTANEOUS APPROACH: ICD-10-PCS | Performed by: SURGERY

## 2024-02-02 PROCEDURE — 37187 VENOUS MECH THROMBECTOMY: CPT | Performed by: SURGERY

## 2024-02-02 PROCEDURE — C1757 CATH, THROMBECTOMY/EMBOLECT: HCPCS

## 2024-02-02 PROCEDURE — 37248 TRLUML BALO ANGIOP 1ST VEIN: CPT | Performed by: SURGERY

## 2024-02-02 PROCEDURE — 99232 SBSQ HOSP IP/OBS MODERATE 35: CPT | Mod: GC | Performed by: HOSPITALIST

## 2024-02-02 PROCEDURE — 85610 PROTHROMBIN TIME: CPT

## 2024-02-02 PROCEDURE — 700117 HCHG RX CONTRAST REV CODE 255: Performed by: SURGERY

## 2024-02-02 PROCEDURE — 700111 HCHG RX REV CODE 636 W/ 250 OVERRIDE (IP): Performed by: ANESTHESIOLOGY

## 2024-02-02 PROCEDURE — C1894 INTRO/SHEATH, NON-LASER: HCPCS | Performed by: SURGERY

## 2024-02-02 PROCEDURE — 160035 HCHG PACU - 1ST 60 MINS PHASE I: Performed by: SURGERY

## 2024-02-02 PROCEDURE — 93010 ELECTROCARDIOGRAM REPORT: CPT | Performed by: INTERNAL MEDICINE

## 2024-02-02 PROCEDURE — 700102 HCHG RX REV CODE 250 W/ 637 OVERRIDE(OP): Performed by: ANESTHESIOLOGY

## 2024-02-02 PROCEDURE — 93005 ELECTROCARDIOGRAM TRACING: CPT

## 2024-02-02 PROCEDURE — 85027 COMPLETE CBC AUTOMATED: CPT

## 2024-02-02 PROCEDURE — 36005 INJECTION EXT VENOGRAPHY: CPT | Mod: 59 | Performed by: SURGERY

## 2024-02-02 PROCEDURE — 110454 HCHG SHELL REV 250: Performed by: SURGERY

## 2024-02-02 PROCEDURE — A9270 NON-COVERED ITEM OR SERVICE: HCPCS | Performed by: SURGERY

## 2024-02-02 PROCEDURE — 85520 HEPARIN ASSAY: CPT

## 2024-02-02 PROCEDURE — C1725 CATH, TRANSLUMIN NON-LASER: HCPCS | Performed by: SURGERY

## 2024-02-02 PROCEDURE — C1769 GUIDE WIRE: HCPCS | Performed by: SURGERY

## 2024-02-02 PROCEDURE — 160029 HCHG SURGERY MINUTES - 1ST 30 MINS LEVEL 4: Performed by: SURGERY

## 2024-02-02 RX ORDER — EPHEDRINE SULFATE 50 MG/ML
5 INJECTION, SOLUTION INTRAVENOUS
Status: DISCONTINUED | OUTPATIENT
Start: 2024-02-02 | End: 2024-02-02 | Stop reason: HOSPADM

## 2024-02-02 RX ORDER — CEFAZOLIN SODIUM 1 G/3ML
INJECTION, POWDER, FOR SOLUTION INTRAMUSCULAR; INTRAVENOUS PRN
Status: DISCONTINUED | OUTPATIENT
Start: 2024-02-02 | End: 2024-02-02 | Stop reason: SURG

## 2024-02-02 RX ORDER — ACETAMINOPHEN 500 MG
1000 TABLET ORAL EVERY 6 HOURS PRN
Status: DISCONTINUED | OUTPATIENT
Start: 2024-02-07 | End: 2024-02-05 | Stop reason: HOSPADM

## 2024-02-02 RX ORDER — DIPHENHYDRAMINE HYDROCHLORIDE 50 MG/ML
12.5 INJECTION INTRAMUSCULAR; INTRAVENOUS
Status: DISCONTINUED | OUTPATIENT
Start: 2024-02-02 | End: 2024-02-02 | Stop reason: HOSPADM

## 2024-02-02 RX ORDER — OXYCODONE HYDROCHLORIDE 10 MG/1
10 TABLET ORAL
Status: DISCONTINUED | OUTPATIENT
Start: 2024-02-02 | End: 2024-02-05 | Stop reason: HOSPADM

## 2024-02-02 RX ORDER — IBUPROFEN 800 MG/1
800 TABLET ORAL 3 TIMES DAILY PRN
Status: DISCONTINUED | OUTPATIENT
Start: 2024-02-06 | End: 2024-02-05 | Stop reason: HOSPADM

## 2024-02-02 RX ORDER — HYDROMORPHONE HYDROCHLORIDE 1 MG/ML
0.1 INJECTION, SOLUTION INTRAMUSCULAR; INTRAVENOUS; SUBCUTANEOUS
Status: DISCONTINUED | OUTPATIENT
Start: 2024-02-02 | End: 2024-02-02 | Stop reason: HOSPADM

## 2024-02-02 RX ORDER — MIDAZOLAM HYDROCHLORIDE 1 MG/ML
INJECTION INTRAMUSCULAR; INTRAVENOUS PRN
Status: DISCONTINUED | OUTPATIENT
Start: 2024-02-02 | End: 2024-02-02 | Stop reason: SURG

## 2024-02-02 RX ORDER — HYDRALAZINE HYDROCHLORIDE 20 MG/ML
5 INJECTION INTRAMUSCULAR; INTRAVENOUS
Status: DISCONTINUED | OUTPATIENT
Start: 2024-02-02 | End: 2024-02-02 | Stop reason: HOSPADM

## 2024-02-02 RX ORDER — HALOPERIDOL 5 MG/ML
1 INJECTION INTRAMUSCULAR EVERY 6 HOURS PRN
Status: DISCONTINUED | OUTPATIENT
Start: 2024-02-02 | End: 2024-02-05 | Stop reason: HOSPADM

## 2024-02-02 RX ORDER — SODIUM CHLORIDE, SODIUM LACTATE, POTASSIUM CHLORIDE, CALCIUM CHLORIDE 600; 310; 30; 20 MG/100ML; MG/100ML; MG/100ML; MG/100ML
INJECTION, SOLUTION INTRAVENOUS
Status: DISCONTINUED | OUTPATIENT
Start: 2024-02-02 | End: 2024-02-02 | Stop reason: SURG

## 2024-02-02 RX ORDER — HYDROMORPHONE HYDROCHLORIDE 2 MG/ML
INJECTION, SOLUTION INTRAMUSCULAR; INTRAVENOUS; SUBCUTANEOUS PRN
Status: DISCONTINUED | OUTPATIENT
Start: 2024-02-02 | End: 2024-02-02 | Stop reason: SURG

## 2024-02-02 RX ORDER — PHENYLEPHRINE HCL IN 0.9% NACL 0.5 MG/5ML
SYRINGE (ML) INTRAVENOUS PRN
Status: DISCONTINUED | OUTPATIENT
Start: 2024-02-02 | End: 2024-02-02 | Stop reason: SURG

## 2024-02-02 RX ORDER — HYDROMORPHONE HYDROCHLORIDE 1 MG/ML
0.2 INJECTION, SOLUTION INTRAMUSCULAR; INTRAVENOUS; SUBCUTANEOUS
Status: DISCONTINUED | OUTPATIENT
Start: 2024-02-02 | End: 2024-02-02 | Stop reason: HOSPADM

## 2024-02-02 RX ORDER — ONDANSETRON 2 MG/ML
INJECTION INTRAMUSCULAR; INTRAVENOUS PRN
Status: DISCONTINUED | OUTPATIENT
Start: 2024-02-02 | End: 2024-02-02 | Stop reason: SURG

## 2024-02-02 RX ORDER — HALOPERIDOL 5 MG/ML
1 INJECTION INTRAMUSCULAR
Status: DISCONTINUED | OUTPATIENT
Start: 2024-02-02 | End: 2024-02-02 | Stop reason: HOSPADM

## 2024-02-02 RX ORDER — OXYCODONE HCL 5 MG/5 ML
10 SOLUTION, ORAL ORAL
Status: COMPLETED | OUTPATIENT
Start: 2024-02-02 | End: 2024-02-02

## 2024-02-02 RX ORDER — KETOROLAC TROMETHAMINE 15 MG/ML
15 INJECTION, SOLUTION INTRAMUSCULAR; INTRAVENOUS EVERY 6 HOURS
Status: DISCONTINUED | OUTPATIENT
Start: 2024-02-03 | End: 2024-02-05 | Stop reason: HOSPADM

## 2024-02-02 RX ORDER — DEXAMETHASONE SODIUM PHOSPHATE 4 MG/ML
4 INJECTION, SOLUTION INTRA-ARTICULAR; INTRALESIONAL; INTRAMUSCULAR; INTRAVENOUS; SOFT TISSUE
Status: COMPLETED | OUTPATIENT
Start: 2024-02-02 | End: 2024-02-02

## 2024-02-02 RX ORDER — ONDANSETRON 2 MG/ML
4 INJECTION INTRAMUSCULAR; INTRAVENOUS
Status: DISCONTINUED | OUTPATIENT
Start: 2024-02-02 | End: 2024-02-02 | Stop reason: HOSPADM

## 2024-02-02 RX ORDER — HEPARIN SODIUM 1000 [USP'U]/ML
40 INJECTION, SOLUTION INTRAVENOUS; SUBCUTANEOUS PRN
Status: DISCONTINUED | OUTPATIENT
Start: 2024-02-02 | End: 2024-02-04

## 2024-02-02 RX ORDER — ONDANSETRON 2 MG/ML
4 INJECTION INTRAMUSCULAR; INTRAVENOUS EVERY 4 HOURS PRN
Status: DISCONTINUED | OUTPATIENT
Start: 2024-02-02 | End: 2024-02-05 | Stop reason: HOSPADM

## 2024-02-02 RX ORDER — HYDROMORPHONE HYDROCHLORIDE 1 MG/ML
0.5 INJECTION, SOLUTION INTRAMUSCULAR; INTRAVENOUS; SUBCUTANEOUS
Status: DISCONTINUED | OUTPATIENT
Start: 2024-02-02 | End: 2024-02-05 | Stop reason: HOSPADM

## 2024-02-02 RX ORDER — HEPARIN SODIUM 5000 [USP'U]/100ML
0-30 INJECTION, SOLUTION INTRAVENOUS CONTINUOUS
Status: DISCONTINUED | OUTPATIENT
Start: 2024-02-02 | End: 2024-02-04

## 2024-02-02 RX ORDER — HYDROMORPHONE HYDROCHLORIDE 1 MG/ML
0.4 INJECTION, SOLUTION INTRAMUSCULAR; INTRAVENOUS; SUBCUTANEOUS
Status: DISCONTINUED | OUTPATIENT
Start: 2024-02-02 | End: 2024-02-02 | Stop reason: HOSPADM

## 2024-02-02 RX ORDER — OXYCODONE HYDROCHLORIDE 5 MG/1
5 TABLET ORAL
Status: DISCONTINUED | OUTPATIENT
Start: 2024-02-02 | End: 2024-02-05 | Stop reason: HOSPADM

## 2024-02-02 RX ORDER — IODIXANOL 270 MG/ML
INJECTION, SOLUTION INTRAVASCULAR
Status: DISCONTINUED | OUTPATIENT
Start: 2024-02-02 | End: 2024-02-02 | Stop reason: HOSPADM

## 2024-02-02 RX ORDER — DIPHENHYDRAMINE HYDROCHLORIDE 50 MG/ML
25 INJECTION INTRAMUSCULAR; INTRAVENOUS EVERY 6 HOURS PRN
Status: DISCONTINUED | OUTPATIENT
Start: 2024-02-02 | End: 2024-02-05 | Stop reason: HOSPADM

## 2024-02-02 RX ORDER — SCOLOPAMINE TRANSDERMAL SYSTEM 1 MG/1
1 PATCH, EXTENDED RELEASE TRANSDERMAL
Status: DISCONTINUED | OUTPATIENT
Start: 2024-02-02 | End: 2024-02-05 | Stop reason: HOSPADM

## 2024-02-02 RX ORDER — SODIUM CHLORIDE, SODIUM LACTATE, POTASSIUM CHLORIDE, CALCIUM CHLORIDE 600; 310; 30; 20 MG/100ML; MG/100ML; MG/100ML; MG/100ML
INJECTION, SOLUTION INTRAVENOUS CONTINUOUS
Status: DISCONTINUED | OUTPATIENT
Start: 2024-02-02 | End: 2024-02-02 | Stop reason: HOSPADM

## 2024-02-02 RX ORDER — MEPERIDINE HYDROCHLORIDE 25 MG/ML
6.25 INJECTION INTRAMUSCULAR; INTRAVENOUS; SUBCUTANEOUS
Status: DISCONTINUED | OUTPATIENT
Start: 2024-02-02 | End: 2024-02-02 | Stop reason: HOSPADM

## 2024-02-02 RX ORDER — ACETAMINOPHEN 500 MG
1000 TABLET ORAL EVERY 6 HOURS
Status: DISCONTINUED | OUTPATIENT
Start: 2024-02-02 | End: 2024-02-05 | Stop reason: HOSPADM

## 2024-02-02 RX ORDER — METOCLOPRAMIDE HYDROCHLORIDE 5 MG/ML
INJECTION INTRAMUSCULAR; INTRAVENOUS PRN
Status: DISCONTINUED | OUTPATIENT
Start: 2024-02-02 | End: 2024-02-02 | Stop reason: SURG

## 2024-02-02 RX ORDER — OXYCODONE HCL 5 MG/5 ML
5 SOLUTION, ORAL ORAL
Status: COMPLETED | OUTPATIENT
Start: 2024-02-02 | End: 2024-02-02

## 2024-02-02 RX ORDER — LIDOCAINE HYDROCHLORIDE 20 MG/ML
INJECTION, SOLUTION EPIDURAL; INFILTRATION; INTRACAUDAL; PERINEURAL PRN
Status: DISCONTINUED | OUTPATIENT
Start: 2024-02-02 | End: 2024-02-02 | Stop reason: SURG

## 2024-02-02 RX ORDER — BUPIVACAINE HYDROCHLORIDE AND EPINEPHRINE 5; 5 MG/ML; UG/ML
INJECTION, SOLUTION EPIDURAL; INTRACAUDAL; PERINEURAL
Status: DISCONTINUED | OUTPATIENT
Start: 2024-02-02 | End: 2024-02-02 | Stop reason: HOSPADM

## 2024-02-02 RX ORDER — DEXAMETHASONE SODIUM PHOSPHATE 4 MG/ML
INJECTION, SOLUTION INTRA-ARTICULAR; INTRALESIONAL; INTRAMUSCULAR; INTRAVENOUS; SOFT TISSUE PRN
Status: DISCONTINUED | OUTPATIENT
Start: 2024-02-02 | End: 2024-02-02 | Stop reason: SURG

## 2024-02-02 RX ORDER — HEPARIN SODIUM 1000 [USP'U]/ML
INJECTION, SOLUTION INTRAVENOUS; SUBCUTANEOUS PRN
Status: DISCONTINUED | OUTPATIENT
Start: 2024-02-02 | End: 2024-02-02 | Stop reason: SURG

## 2024-02-02 RX ORDER — SODIUM CHLORIDE 9 MG/ML
INJECTION, SOLUTION INTRAVENOUS CONTINUOUS
Status: ACTIVE | OUTPATIENT
Start: 2024-02-02 | End: 2024-02-03

## 2024-02-02 RX ORDER — ROCURONIUM BROMIDE 10 MG/ML
INJECTION, SOLUTION INTRAVENOUS PRN
Status: DISCONTINUED | OUTPATIENT
Start: 2024-02-02 | End: 2024-02-02 | Stop reason: SURG

## 2024-02-02 RX ORDER — LABETALOL HYDROCHLORIDE 5 MG/ML
5 INJECTION, SOLUTION INTRAVENOUS
Status: DISCONTINUED | OUTPATIENT
Start: 2024-02-02 | End: 2024-02-02 | Stop reason: HOSPADM

## 2024-02-02 RX ADMIN — ROCURONIUM BROMIDE 80 MG: 50 INJECTION, SOLUTION INTRAVENOUS at 15:46

## 2024-02-02 RX ADMIN — DEXAMETHASONE SODIUM PHOSPHATE 4 MG: 4 INJECTION INTRA-ARTICULAR; INTRALESIONAL; INTRAMUSCULAR; INTRAVENOUS; SOFT TISSUE at 23:44

## 2024-02-02 RX ADMIN — FENTANYL CITRATE 50 MCG: 50 INJECTION, SOLUTION INTRAMUSCULAR; INTRAVENOUS at 17:47

## 2024-02-02 RX ADMIN — PROPOFOL 250 MG: 10 INJECTION, EMULSION INTRAVENOUS at 15:46

## 2024-02-02 RX ADMIN — DEXAMETHASONE SODIUM PHOSPHATE 8 MG: 4 INJECTION INTRA-ARTICULAR; INTRALESIONAL; INTRAMUSCULAR; INTRAVENOUS; SOFT TISSUE at 15:46

## 2024-02-02 RX ADMIN — LIDOCAINE HYDROCHLORIDE 100 MG: 20 INJECTION, SOLUTION EPIDURAL; INFILTRATION; INTRACAUDAL at 15:46

## 2024-02-02 RX ADMIN — HYDROMORPHONE HYDROCHLORIDE 0.5 MG: 1 INJECTION, SOLUTION INTRAMUSCULAR; INTRAVENOUS; SUBCUTANEOUS at 21:46

## 2024-02-02 RX ADMIN — Medication 100 MCG: at 18:08

## 2024-02-02 RX ADMIN — METOCLOPRAMIDE 10 MG: 5 INJECTION, SOLUTION INTRAMUSCULAR; INTRAVENOUS at 15:46

## 2024-02-02 RX ADMIN — OXYCODONE HYDROCHLORIDE 10 MG: 5 SOLUTION ORAL at 19:53

## 2024-02-02 RX ADMIN — KETOROLAC TROMETHAMINE 15 MG: 15 INJECTION, SOLUTION INTRAMUSCULAR; INTRAVENOUS at 23:32

## 2024-02-02 RX ADMIN — HYDROMORPHONE HYDROCHLORIDE 0.4 MG: 2 INJECTION INTRAMUSCULAR; INTRAVENOUS; SUBCUTANEOUS at 19:20

## 2024-02-02 RX ADMIN — SODIUM CHLORIDE, POTASSIUM CHLORIDE, SODIUM LACTATE AND CALCIUM CHLORIDE: 600; 310; 30; 20 INJECTION, SOLUTION INTRAVENOUS at 18:54

## 2024-02-02 RX ADMIN — SUGAMMADEX 200 MG: 100 INJECTION, SOLUTION INTRAVENOUS at 19:24

## 2024-02-02 RX ADMIN — Medication 25 MG: at 16:27

## 2024-02-02 RX ADMIN — HEPARIN SODIUM 5000 UNITS: 1000 INJECTION, SOLUTION INTRAVENOUS; SUBCUTANEOUS at 18:08

## 2024-02-02 RX ADMIN — Medication 100 MCG: at 17:31

## 2024-02-02 RX ADMIN — ONDANSETRON 4 MG: 2 INJECTION INTRAMUSCULAR; INTRAVENOUS at 20:52

## 2024-02-02 RX ADMIN — HEPARIN SODIUM 18 UNITS/KG/HR: 5000 INJECTION, SOLUTION INTRAVENOUS at 12:13

## 2024-02-02 RX ADMIN — MIDAZOLAM HYDROCHLORIDE 2 MG: 1 INJECTION, SOLUTION INTRAMUSCULAR; INTRAVENOUS at 15:44

## 2024-02-02 RX ADMIN — ONDANSETRON 4 MG: 2 INJECTION INTRAMUSCULAR; INTRAVENOUS at 18:51

## 2024-02-02 RX ADMIN — Medication 100 MCG: at 17:03

## 2024-02-02 RX ADMIN — ACETAMINOPHEN 1000 MG: 500 TABLET ORAL at 04:53

## 2024-02-02 RX ADMIN — CEFAZOLIN 2 G: 1 INJECTION, POWDER, FOR SOLUTION INTRAMUSCULAR; INTRAVENOUS at 15:54

## 2024-02-02 RX ADMIN — SODIUM CHLORIDE, POTASSIUM CHLORIDE, SODIUM LACTATE AND CALCIUM CHLORIDE: 600; 310; 30; 20 INJECTION, SOLUTION INTRAVENOUS at 15:39

## 2024-02-02 RX ADMIN — HEPARIN SODIUM 5000 UNITS: 1000 INJECTION, SOLUTION INTRAVENOUS; SUBCUTANEOUS at 18:47

## 2024-02-02 RX ADMIN — SODIUM CHLORIDE: 9 INJECTION, SOLUTION INTRAVENOUS at 20:43

## 2024-02-02 RX ADMIN — FENTANYL CITRATE 100 MCG: 50 INJECTION, SOLUTION INTRAMUSCULAR; INTRAVENOUS at 15:46

## 2024-02-02 RX ADMIN — FENTANYL CITRATE 50 MCG: 50 INJECTION, SOLUTION INTRAMUSCULAR; INTRAVENOUS at 18:47

## 2024-02-02 RX ADMIN — ROCURONIUM BROMIDE 10 MG: 50 INJECTION, SOLUTION INTRAVENOUS at 18:30

## 2024-02-02 RX ADMIN — HYDROMORPHONE HYDROCHLORIDE 0.4 MG: 2 INJECTION INTRAMUSCULAR; INTRAVENOUS; SUBCUTANEOUS at 19:01

## 2024-02-02 RX ADMIN — ACETAMINOPHEN 1000 MG: 500 TABLET ORAL at 21:45

## 2024-02-02 ASSESSMENT — ENCOUNTER SYMPTOMS
NEUROLOGICAL NEGATIVE: 1
COUGH: 0
PALPITATIONS: 0
ABDOMINAL PAIN: 0
HEARTBURN: 0
EYES NEGATIVE: 1
FEVER: 0
HEMOPTYSIS: 0
GASTROINTESTINAL NEGATIVE: 1
MYALGIAS: 0
CHILLS: 0
PSYCHIATRIC NEGATIVE: 1
BRUISES/BLEEDS EASILY: 0
NECK PAIN: 0
NAUSEA: 0
DOUBLE VISION: 0
WEIGHT LOSS: 0
BLURRED VISION: 0
VOMITING: 0
SHORTNESS OF BREATH: 0

## 2024-02-02 ASSESSMENT — PAIN DESCRIPTION - PAIN TYPE
TYPE: ACUTE PAIN;SURGICAL PAIN
TYPE: ACUTE PAIN

## 2024-02-02 ASSESSMENT — FIBROSIS 4 INDEX: FIB4 SCORE: 0.3

## 2024-02-02 NOTE — NON-PROVIDER
INTERNAL MEDICINE MEDICAL STUDENT PROGRESS NOTE          PATIENT ID:  NAME:  Ha Storey  MRN:               [id number]  YOB: 2000    Date of Admission: 1/31/2024     Attending: SHIRLEY Trevizo M.d.     STUDENT: [facility]    Primary Care Physician:  Pcp Pt States None    HPI: Ha Storey is a 23 y.o. male admitted for extensive LUE DVT on hospital day 2. He has no prior medical history. He first noticed swelling on 1/27/2024, which progressed to the entire forearm with significant pain on the entire LUE and left chest. He reported tightness and reduced range of motion, but denied tingling, numbness, discoloration, skin changes, trauma or triggers. 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 He went to urgent care where LUE ES demonstrated extensive DVT and SVT of left basilic vein, and was sent to ED.     ED course demonstrated vitals WNL, unremarkable labs except for mildly elevated APTT at 39, with normal INR of 1.06. Hematoloy and Vascular Surgery consulted, recommended for admission and hypercoagulable workup, with vascular recs for thrombectomy/thrombolysis/first rib resection. Antiphospholipid, Prot C/S def, Factor V Leiden, prothrombin, ATIII workup still pending. IV heparin drip was started with daily improvement in symptoms. CXR and CT negative for cervical rib. Patient kept NPO for thrombectomy today    Interval Problem Update  No acute events overnight, patient reports improvement in symptoms. Reporting decreased swelling and increased ROM. No acute concerns or new symptoms. CXR and CT negative for cervical rib. Vascular met with patient and parents this morning to discuss surgery to be later this afternoon.     ROS:  Review of Systems   Constitutional:  Negative for chills and fever.   Respiratory:  Negative for shortness of breath.    Cardiovascular:  Negative for chest pain.   Gastrointestinal:  Negative for abdominal pain,  nausea and vomiting.   Musculoskeletal:         Left arm swelling and pain        OBJECTIVE:  Temp:  [36.1 °C (97 °F)-36.8 °C (98.2 °F)] 36.1 °C (97 °F)  Pulse:  [51-65] 62  Resp:  [17-18] 18  BP: (107-128)/(58-70) 113/63  SpO2:  [92 %-97 %] 97 %      Intake/Output Summary (Last 24 hours) at 2/2/2024 1256  Last data filed at 2/2/2024 0800  Gross per 24 hour   Intake 879.2 ml   Output 0 ml   Net 879.2 ml       PHYSICAL EXAM:  Physical Exam  Constitutional:       General: He is not in acute distress.     Appearance: Normal appearance. He is not ill-appearing.   Cardiovascular:      Rate and Rhythm: Normal rate and regular rhythm.   Pulmonary:      Breath sounds: Normal breath sounds. No wheezing, rhonchi or rales.   Musculoskeletal:         General: Swelling and tenderness present.      Comments: LUE swelling and tenderness   Skin:     General: Skin is warm and dry.   Neurological:      General: No focal deficit present.      Mental Status: He is alert and oriented to person, place, and time.   Psychiatric:         Mood and Affect: Mood normal.         Behavior: Behavior normal.         LABS:  Recent Labs     01/31/24 1743 02/01/24 0125 02/02/24  0954   WBC 8.8 7.9 7.4   RBC 5.66 5.71 5.64   HEMOGLOBIN 16.3 16.7 16.3   HEMATOCRIT 47.6 47.8 48.8   MCV 84.1 83.7 86.5   MCH 28.8 29.2 28.9   RDW 39.7 39.3 41.1   PLATELETCT 301 320 282   MPV 9.2 10.0 9.3   NEUTSPOLYS 75.60*  --   --    LYMPHOCYTES 15.20*  --   --    MONOCYTES 7.10  --   --    EOSINOPHILS 1.30  --   --    BASOPHILS 0.50  --   --      Recent Labs     01/31/24 1743 02/01/24 0125   SODIUM 139 142   POTASSIUM 3.7 3.5*   CHLORIDE 101 103   CO2 24 25   BUN 13 12   CREATININE 1.13 1.04   CALCIUM 9.7 9.3   ALBUMIN 4.5  --      Estimated GFR/CRCL = Estimated Creatinine Clearance: 96.1 mL/min (by C-G formula based on SCr of 1.04 mg/dL).  Recent Labs     01/31/24  1743 02/01/24  0125   GLUCOSE 130* 97     Recent Labs     01/31/24  1743   ASTSGOT 17   ALTSGPT 17    TBILIRUBIN 0.5   ALKPHOSPHAT 66   GLOBULIN 3.5   INR 1.06             Recent Labs     01/31/24  1743   INR 1.06   APTT 39.9*         IMAGING:  CT-CTA CHEST WITH & W/O-POST PROCESS   Final Result      1.  Negative for thrombus within the left innominate vein, left internal jugular vein and no definite thrombus identified in the left subclavian vein.      2.  Edema adjacent to the left axillary vein possibly indicating thrombus      3.  Patent arterial structures      4.  Mild right basilar atelectasis            DX-CHEST-LIMITED (1 VIEW)   Final Result      No evidence of acute cardiopulmonary process.          CULTURES:   Results       ** No results found for the last 168 hours. **            MEDS:  Current Facility-Administered Medications   Medication Last Admin    Pharmacy Consult Request ...Pain Management Review 1 Each      acetaminophen (Tylenol) tablet 1,000 mg 1,000 mg at 02/02/24 0453    Followed by    [START ON 2/6/2024] acetaminophen (Tylenol) tablet 1,000 mg      oxyCODONE immediate-release (Roxicodone) tablet 5 mg      Or    oxyCODONE immediate release (Roxicodone) tablet 10 mg      Or    HYDROmorphone (Dilaudid) injection 0.5 mg      heparin infusion 25,000 units in 500 mL 0.45% NACL 18 Units/kg/hr at 02/02/24 1213    heparin injection 2,800 Units         ASSESSMENT/PLAN:  23 y.o. male with no PMHx admitted for extensive, unprovoked LUE DVT electing for thrombectomy and surgical resection of first rib.     Left Upper Extremity DVT  Extensive LUE DVT confirmed by ultrasound, subclavian, axillary, brachial and basilic vein involvement. Patient has no provoking triggers but does have FHx (PGF and paternal aunt) of frequent blood clots. No symptoms of malignancy as patient is well-built with no insidious features. Heparin drip has improved symptoms and patient has elected for thrombectomy and first rib resection. Per Vascular, presentation is classic for thoracic outlet syndrome caused by anatomical  variance and patient's increased muscle mass. CXR and CT negative for cervical rib. Possible that hypercoagulable pathology exacerbated/incited patient's DIANNA due to FHx, so workup will still be completed. TEG abnormality due to heparin infusion. Patient will receive thrombectomy today and first rib resection after.  -Continue heparin drip   -NPO until thrombectomy  -Hypercoagulable workup for antiphospholipid syndrome (WILDA, lupus anticoagulant, anti-Beta 2 glycoprotein), Prot C/S deficiency, Factor V Leiden, prothrombin mutation, ATIII pending  -Pain management with Tylenol and oxy PRN           Core Measures:  Fluids: None  Lines: PIV  Abx: None  Diet: NPO  PPX: therapeutic heparin drip     CODE Status: Full Code        Disposition  Patient is not medically cleared for discharge.         Shemar Maher, JUSTINEII  [facility] [employer]

## 2024-02-02 NOTE — PROGRESS NOTES
VASCULAR SURGERY SERVICE                        Progress Note  _________________________________    Seen and examined, no major changes overnight  CT negative for cervical rib and negative for intrathoracic pathology  Planning left first rib resection and DVT thrombectomy today, tentatively around 1500.  NPO except meds  Surgery discussed in detail with patient and his parents, questions answered, agree to proceed.    Álvaro Chamberlain MD  [facility]   Vo[place] preferred or call my office [phone]  __________________________________________________  Patient:Ha Mendezano   MRN:[id number]

## 2024-02-02 NOTE — PROGRESS NOTES
"  Progress note    HPI: Ha Storey is a 23 y.o. male admitted 2/1/2024 for worsening left arm swelling with mild pain in the setting of ultrasound positive for extensive left upper extremity deep vein thrombosis and superficial vein thrombosis of the basilic vein apparently stable.  Pertinent medical history includes \"clotting disorders\" on father side and weightlifting.  No apparent trauma, no hypertension, no immobility, no weight loss or any other symptoms that would suggest malignancy, no previous surgeries or PICC lines.  Admitted for higher level of care considering possible thrombectomy/thrombolysis and/or congenital coagulation disease.  Put on acute management with heparin, workup ordered.    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  Options include conservative therapy with simple anticoagulation and compression of the arm for indefinite time or surgical approach including venous thrombectomy with posterior first rib resection done during this admission.      Today 2/2 he is being taken to the OR for DVT thrombectomy with left first rib resection. CTA negative for cervical rib. Patient feels like his arm pain is somewhat improved and he is ready for the procedure.       Current Facility-Administered Medications:     Pharmacy Consult Request ...Pain Management Review 1 Each, 1 Each, Other, PHARMACY TO DOSE, Eileen Neely M.D.    acetaminophen (Tylenol) tablet 1,000 mg, 1,000 mg, Oral, Q6HRS, 1,000 mg at 02/02/24 0453 **FOLLOWED BY** [START ON 2/6/2024] acetaminophen (Tylenol) tablet 1,000 mg, 1,000 mg, Oral, Q6HRS PRN, Eileen Neely M.D.    oxyCODONE immediate-release (Roxicodone) tablet 5 mg, 5 mg, Oral, Q3HRS PRN **OR** oxyCODONE immediate release (Roxicodone) tablet 10 mg, 10 mg, Oral, Q3HRS PRN **OR** HYDROmorphone (Dilaudid) injection 0.5 mg, 0.5 mg, Intravenous, Q3HRS PRN, Eileen Neely, " M.D.    heparin infusion 25,000 units in 500 mL 0.45% NACL, 0-30 Units/kg/hr, Intravenous, Continuous, Leigh Ann Hernandez M.D., Last Rate: 24.9 mL/hr at 02/02/24 1213, 18 Units/kg/hr at 02/02/24 1213    heparin injection 2,800 Units, 40 Units/kg, Intravenous, PRN, Leigh Ann Hernandez M.D.   Patient has no known allergies.     Vitals:    02/02/24 0022 02/02/24 0400 02/02/24 0744 02/02/24 1213   BP: 127/59 120/69 107/58 113/63   Pulse: (!) 59 64 (!) 51 62   Resp: 17 17 18 18   Temp: 36.4 °C (97.5 °F) 36.5 °C (97.7 °F) 36.4 °C (97.5 °F) 36.1 °C (97 °F)   TempSrc: Temporal Temporal Temporal    SpO2: 95% 96% 96% 97%   Weight:  68.9 kg (151 lb 14.4 oz)     Height:         Body mass index is 25.28 kg/m².    Review of Systems   Constitutional:  Negative for chills, fever and weight loss.   HENT: Negative.  Negative for hearing loss.    Eyes: Negative.  Negative for blurred vision and double vision.   Respiratory:  Negative for cough and hemoptysis.    Cardiovascular:  Negative for chest pain and palpitations.   Gastrointestinal: Negative.  Negative for heartburn.   Genitourinary: Negative.  Negative for dysuria.   Musculoskeletal:  Negative for myalgias and neck pain.   Skin: Negative.  Negative for itching and rash.   Neurological: Negative.    Endo/Heme/Allergies:  Does not bruise/bleed easily.   Psychiatric/Behavioral: Negative.        Physical Exam  Vitals and nursing note reviewed.   Constitutional:       Appearance: He is normal weight.   Cardiovascular:      Rate and Rhythm: Normal rate and regular rhythm.      Pulses: Normal pulses.      Heart sounds: Normal heart sounds.   Pulmonary:      Effort: Pulmonary effort is normal.   Abdominal:      General: Bowel sounds are normal.   Musculoskeletal:         General: Normal range of motion.      Cervical back: Normal range of motion. No rigidity.      Comments: Improved fullness in left axillary region, left upper arm less tender to palpation   Lymphadenopathy:      Cervical: No  cervical adenopathy.   Skin:     General: Skin is warm.      Capillary Refill: Capillary refill takes less than 2 seconds.   Neurological:      Mental Status: He is alert. Mental status is at baseline.   Psychiatric:         Mood and Affect: Mood normal.         Behavior: Behavior normal.         Thought Content: Thought content normal.         Judgment: Judgment normal.       LABS:   Recent Labs     24  0125 24  0954   WBC 8.8 7.9 7.4   RBC 5.66 5.71 5.64   HEMOGLOBIN 16.3 16.7 16.3   HEMATOCRIT 47.6 47.8 48.8   MCV 84.1 83.7 86.5   MCH 28.8 29.2 28.9   MCHC 34.2 34.9 33.4   RDW 39.7 39.3 41.1   PLATELETCT 301 320 282   MPV 9.2 10.0 9.3        Recent Labs     24  0125   SODIUM 139 142   POTASSIUM 3.7 3.5*   CHLORIDE 101 103   CO2 24 25   GLUCOSE 130* 97   BUN 13 12   CREATININE 1.13 1.04   CALCIUM 9.7 9.3        Recent Labs     243 24  0125   ALTSGPT 17  --    ASTSGOT 17  --    ALKPHOSPHAT 66  --    TBILIRUBIN 0.5  --    GLUCOSE 130* 97        RADIOLOGY:   CT-CTA CHEST WITH & W/O-POST PROCESS   Final Result      1.  Negative for thrombus within the left innominate vein, left internal jugular vein and no definite thrombus identified in the left subclavian vein.      2.  Edema adjacent to the left axillary vein possibly indicating thrombus      3.  Patent arterial structures      4.  Mild right basilar atelectasis            DX-CHEST-LIMITED (1 VIEW)   Final Result      No evidence of acute cardiopulmonary process.         MEDICAL DECISION MAKIN-year-old left handed male admitted 2024 for worsening left arm swelling with mild pain in axilla in the setting of apparently stable extensive left upper extremity deep vein thrombosis with superficial vein thrombosis of the basilic vein.  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      Disposition: Probably home,  but will need to reassess.  Follow up outpatient with primary care physician, vascular surgery and other specialties already following.     ASSESSMENT AND PLAN:   * Extensive left upper extremity deep vein thrombosis with superficial vein thrombosis of basilic vein, unclear if nontraumatic or traumatic.  Per vascular surgery, concern for Paget Robles disease   -- OR 2/2 planned for DVT thrombectomy with left first rib resection  -- Hypercoagulable workup still pending  - Continue heparin drip pending procedure outcome, f/u vascular recs       * DVT prophylaxis with therapeutic heparin for main problem     Tameka Jarquin M.D.  Internal Medicine Resident   [facility]      This note was generated using voice recognition software which has a small chance of producing errors of grammar and possibly content. I have made every reasonable attempt to find and correct any obvious errors but expect that some may not be found prior to finalization of this note.

## 2024-02-02 NOTE — ANESTHESIA PREPROCEDURE EVALUATION
Case: [id number] Date/Time: 02/02/24 1344    Procedures:       EXCISION, FIRST RIB      THROMBECTOMY    Location: [facility] OR 12 / SURGERY [facility]    Surgeons: Álvaro Chamberlain M.D.          24 yo M w/ L axillary DVT    Relevant Problems   ANESTHESIA (within normal limits)      PULMONARY (within normal limits)      CARDIAC   (positive) DVT of left axillary vein, acute (HCC)      GI (within normal limits)      ENDO (within normal limits)       Physical Exam    Airway   Mallampati: II  TM distance: >3 FB  Neck ROM: full       Cardiovascular - normal exam  Rhythm: regular  Rate: normal  (-) murmur     Dental - normal exam           Pulmonary - normal exam  Breath sounds clear to auscultation     Abdominal    Neurological - normal exam                   Anesthesia Plan    ASA 2       Plan - general       Airway plan will be ETT          Induction: intravenous    Postoperative Plan: Postoperative administration of opioids is intended.    Pertinent diagnostic labs and testing reviewed    Informed Consent:    Anesthetic plan and risks discussed with patient.    Use of blood products discussed with: patient whom consented to blood products.

## 2024-02-02 NOTE — CARE PLAN
The patient is Stable - Low risk of patient condition declining or worsening    Shift Goals  Clinical Goals: monitor XA, pain control,CTA  Patient Goals: rest    Progress made toward(s) clinical / shift goals:    Problem: Knowledge Deficit - Standard  Goal: Patient and family/care givers will demonstrate understanding of plan of care, disease process/condition, diagnostic tests and medications  Outcome: Progressing  Note: Reviewed plan of care with patient.  Discussed procedures planned and need to be NPO at midnight.  Patient verbalized understanding. Patients CTA was completed.      Problem: Risk for Bleeding  Goal: Patient will take measures to prevent bleeding and recognizes signs of bleeding that need to be reported immediately to a health care professional  Outcome: Progressing  Note: Patient monitored for signs of bleeding.  No signs noted.       Problem: Pain - Standard  Goal: Alleviation of pain or a reduction in pain to the patient’s comfort goal  Outcome: Met  Note: Patients pain well controlled with scheduled tylenol.

## 2024-02-02 NOTE — PROGRESS NOTES
Telemetry Report:        Per Telest[facility] from Monitor Room   SB-SR 41-70  PAC, PVC   .17/.09/.38

## 2024-02-02 NOTE — CARE PLAN
The patient is Stable - Low risk of patient condition declining or worsening    Shift Goals  Clinical Goals: monitor XA, control pain  Patient Goals: rest    Progress made toward(s) clinical / shift goals:        Problem: Risk for Bleeding  Goal: Patient will take measures to prevent bleeding and recognizes signs of bleeding that need to be reported immediately to a health care professional  Outcome: Progressing  Note: 1. Educate the patient and family members about signs of bleeding that need to be reported to a health care provider 2. Educate the at-risk patient about precautionary measures to prevent tissue trauma or disruption of the normal clotting mechanisms -- Use a soft-bristled toothbrush and nonabrasive toothpaste. Avoid the use of toothpicks and dental floss -- Avoid rectal suppositories, thermometers, enemas, vaginal douches, and tampons -- Limit straining with bowel movements, forceful nose blowing, coughing, or sneezing -- Be careful when using sharp objects like scissors and knives. Use an electric razor for shaving (not razor blades) 3. Let the patient use normal saline nasal sprays and emollient lip balms 4. Explain to a sexually active patient the use water-soluble lubricants during intercourse 5. Teach the patient about measures to reduce constipation such as increased fluid intake and dietary fiber 6. Inform the patient to check the color and consistency of stools 7. Tell the female patient to inform the health care provider when there is an increase in menstrual bleeding as indicated by an increase in the number of sanitary pads used 8. Tell the patient to observe skin and mucous membranes for oozing of blood      Problem: Respiratory  Goal: Patient will achieve/maintain optimum respiratory ventilation and gas exchange  Outcome: Progressing  Flowsheets (Taken 2/1/2024 1602)  O2 Delivery Device: None - Room Air  Incentive Spirometer: Effective  Deep Breathe and Cough: Performs Correctly  Note:    1.  Assess and monitor rate, rhythm, depth and effort of respiration 2.  Breath sounds assessed qshift and/or as needed 3.  Assess O2 saturation, administer/titrate oxygen as ordered 4.  Position patient for maximum ventilatory efficiency 5.  Turn, cough, and deep breath with splinting to improve effectiveness 6.  Collaborate with RT to administer medication/treatments per order 7.  Encourage use of incentive spirometer and encourage patient to cough after use and utilize splinting techniques if applicable 8.  Airway suctioning 9.  Monitor sputum production for changes in color, consistency and frequency 10. Perform frequent oral hygiene 11. Alternate physical activity with rest periods  Expected end:          Patient is not progressing towards the following goals:  NA

## 2024-02-02 NOTE — PROGRESS NOTES
Telemetry Summary    Rate: Sinus mitchel, sinus rhythm  Rhythm:  49-77  Ectopy: NONE    .13/.09/.34

## 2024-02-03 LAB
ANION GAP SERPL CALC-SCNC: 14 MMOL/L (ref 7–16)
BUN SERPL-MCNC: 15 MG/DL (ref 8–22)
CALCIUM SERPL-MCNC: 8.7 MG/DL (ref 8.5–10.5)
CHLORIDE SERPL-SCNC: 100 MMOL/L (ref 96–112)
CO2 SERPL-SCNC: 21 MMOL/L (ref 20–33)
CREAT SERPL-MCNC: 1.13 MG/DL (ref 0.5–1.4)
ERYTHROCYTE [DISTWIDTH] IN BLOOD BY AUTOMATED COUNT: 38.5 FL (ref 35.9–50)
GFR SERPLBLD CREATININE-BSD FMLA CKD-EPI: 93 ML/MIN/1.73 M 2
GLUCOSE SERPL-MCNC: 163 MG/DL (ref 65–99)
HCT VFR BLD AUTO: 41.5 % (ref 42–52)
HGB BLD-MCNC: 14.2 G/DL (ref 14–18)
MCH RBC QN AUTO: 28.9 PG (ref 27–33)
MCHC RBC AUTO-ENTMCNC: 34.2 G/DL (ref 32.3–36.5)
MCV RBC AUTO: 84.5 FL (ref 81.4–97.8)
PLATELET # BLD AUTO: 241 K/UL (ref 164–446)
PMV BLD AUTO: 9.1 FL (ref 9–12.9)
POTASSIUM SERPL-SCNC: 4.6 MMOL/L (ref 3.6–5.5)
RBC # BLD AUTO: 4.91 M/UL (ref 4.7–6.1)
SODIUM SERPL-SCNC: 135 MMOL/L (ref 135–145)
TEST NAME 95000: NORMAL
UFH PPP CHRO-ACNC: 0.31 IU/ML
UFH PPP CHRO-ACNC: 0.38 IU/ML
UFH PPP CHRO-ACNC: 1.03 IU/ML
WBC # BLD AUTO: 11.7 K/UL (ref 4.8–10.8)

## 2024-02-03 PROCEDURE — A9270 NON-COVERED ITEM OR SERVICE: HCPCS | Performed by: SURGERY

## 2024-02-03 PROCEDURE — 80048 BASIC METABOLIC PNL TOTAL CA: CPT

## 2024-02-03 PROCEDURE — 85027 COMPLETE CBC AUTOMATED: CPT

## 2024-02-03 PROCEDURE — 700111 HCHG RX REV CODE 636 W/ 250 OVERRIDE (IP): Performed by: SURGERY

## 2024-02-03 PROCEDURE — 700102 HCHG RX REV CODE 250 W/ 637 OVERRIDE(OP): Performed by: SURGERY

## 2024-02-03 PROCEDURE — 85520 HEPARIN ASSAY: CPT | Mod: 91

## 2024-02-03 PROCEDURE — 36415 COLL VENOUS BLD VENIPUNCTURE: CPT

## 2024-02-03 PROCEDURE — 770001 HCHG ROOM/CARE - MED/SURG/GYN PRIV*

## 2024-02-03 RX ADMIN — OXYCODONE 5 MG: 5 TABLET ORAL at 13:28

## 2024-02-03 RX ADMIN — ACETAMINOPHEN 1000 MG: 500 TABLET ORAL at 09:56

## 2024-02-03 RX ADMIN — ONDANSETRON 4 MG: 2 INJECTION INTRAMUSCULAR; INTRAVENOUS at 02:11

## 2024-02-03 RX ADMIN — KETOROLAC TROMETHAMINE 15 MG: 15 INJECTION, SOLUTION INTRAMUSCULAR; INTRAVENOUS at 17:37

## 2024-02-03 RX ADMIN — OXYCODONE 5 MG: 5 TABLET ORAL at 17:35

## 2024-02-03 RX ADMIN — OXYCODONE 5 MG: 5 TABLET ORAL at 22:14

## 2024-02-03 RX ADMIN — KETOROLAC TROMETHAMINE 15 MG: 15 INJECTION, SOLUTION INTRAMUSCULAR; INTRAVENOUS at 05:48

## 2024-02-03 RX ADMIN — HEPARIN SODIUM 15 UNITS/KG/HR: 5000 INJECTION, SOLUTION INTRAVENOUS at 16:19

## 2024-02-03 RX ADMIN — ACETAMINOPHEN 1000 MG: 500 TABLET ORAL at 20:05

## 2024-02-03 RX ADMIN — HYDROMORPHONE HYDROCHLORIDE 0.5 MG: 1 INJECTION, SOLUTION INTRAMUSCULAR; INTRAVENOUS; SUBCUTANEOUS at 02:11

## 2024-02-03 RX ADMIN — OXYCODONE 5 MG: 5 TABLET ORAL at 09:56

## 2024-02-03 RX ADMIN — KETOROLAC TROMETHAMINE 15 MG: 15 INJECTION, SOLUTION INTRAMUSCULAR; INTRAVENOUS at 12:38

## 2024-02-03 RX ADMIN — ACETAMINOPHEN 1000 MG: 500 TABLET ORAL at 03:37

## 2024-02-03 ASSESSMENT — PAIN DESCRIPTION - PAIN TYPE
TYPE: SURGICAL PAIN
TYPE: SURGICAL PAIN
TYPE: ACUTE PAIN;SURGICAL PAIN
TYPE: ACUTE PAIN
TYPE: SURGICAL PAIN
TYPE: ACUTE PAIN;SURGICAL PAIN
TYPE: ACUTE PAIN
TYPE: ACUTE PAIN;SURGICAL PAIN
TYPE: ACUTE PAIN;SURGICAL PAIN
TYPE: SURGICAL PAIN
TYPE: ACUTE PAIN;SURGICAL PAIN
TYPE: SURGICAL PAIN

## 2024-02-03 NOTE — OR NURSING
1935 Pt from OR, asleep, with O2 support of 6 L/min via mask, respirations regular and spontaneous, VS WNL. Pt post op site at left arm, dressing CDI, sling in place per Dr. Chamberlain. Left fingers warm to touch, pink in color and with good cap refill, radial pulse present and bounding upon palpation. SCDs ON, bed set to lowest position and locked in place.    1950 Pt complains of pain, medicated per MAR.    2010 Updated Erika (Mom) thru telephone call regarding pt's condition and POC.    2020 Report given to Carolann GUY.    2025 Pt to room with 2 ACLS RN, with O2 support of 1 L/min via nasal cannula.

## 2024-02-03 NOTE — CARE PLAN
The patient is Stable - Low risk of patient condition declining or worsening    Shift Goals  Clinical Goals: Pain/nausea control, MIVF/IV heparin, 2 RN skin check, mobility, rest  Patient Goals: Pain/nausea control, sleep  Family Goals: Comfort    Progress made toward(s) clinical / shift goals:  Medicated for pain/nausea; see MAR. Patient tolerated MIVF, stopped at 0200. IV heparin in place; following protocol. 2 RN skin check performed; see PN. Patient slept intermittently during shift.     Patient is not progressing towards the following goals: Hesitation noted with mobility. Encouraged during AM shift.

## 2024-02-03 NOTE — OP REPORT
VASCULAR SURGERY SERVICE                       Operative Note  _____________________________________________________    Date: 2024    Patient: Ha Storey  : 2000  MRN: [id number]  _____________________________________________________      Preoperative Diagnosis:  -Acute left subclavian vein DVT associated with left-sided venous thoracic outlet syndrome    Postoperative Diagnosis:  -Acute left subclavian vein DVT associated with left-sided venous thoracic outlet syndrome    Procedure:  -Left upper extremity venogram (73463)  -Percutaneous mechanical thrombectomy of the left axillary and subclavian vein with a 12 Citizen of the [place] lightening suction thrombectomy catheter (70582)  -10 mm plain balloon angioplasty of the left subclavian vein (51407)  -Resection of left first rib for treatment of thoracic outlet syndrome (89786)    _____________________________________________________    Surgeon:                                 Álvaro Chamberlain MD    Assistant:   none    Anesthesia:                             General anesthesia plus local anesthetic    EBL:                                        250cc    Heparin:                                  Systemically heparinized during the thrombectomy and continued afterward    Specimen:   Left first rib segment sent to pathology    Findings:   Complete recanalization of the left subclavian vein after thrombectomy and angioplasty    Complications:                        none    Disposition:                             Tolerated well, sent to recovery in stable condition  _____________________________________________________    History:  23 y.o. male presenting with 1 week history of worsening left upper extremity pain and swelling.  Duplex confirmed a left subclavian DVT.  Findings are consistent with a left venous thoracic outlet syndrome and decompression with first rib resection as well as thrombectomy and angioplasty was recommended.  Given that this  procedure could be performed without the use of thrombolytics, I recommended performing both the endovascular portion as well as the rib resection during the same operation.    I conducted a thorough preoperative discussion regarding our findings and recommendations.  I explained the operation, alternatives, and potential risks, including but not limited to bleeding, infection, injury to vessels or nerves, possible multiple incisions, use of xray and contrast exposure, risks of anesthesia, and global risks such as stroke, heart attack, pulmonary complications, and even potentially not surviving the operation or the recovery.  All questions were answered. They understand and agree to proceed.    Procedure Summary:  Following informed consent, the patient was placed supine on the operating table, and general anesthesia was administered.  The patient was prepped and draped sterile in the usual fashion. Surgical time-out was called, and everyone was in agreement.  The patient did receive preoperative prophylactic antibiotics.      Procedure began with a left upper extremity venogram to confirm the presence of the DVT.  I performed percutaneous access of the left brachial vein near the antecubital fossa and performed a venogram which did confirm presence of thrombus within the axillary and subclavian vein, in fact both of these veins were completely occluded and there were several tributary veins going around to the innominate.  The 4 Korean sheath remained in the left brachial vein to be used later for the thrombectomy.    Local anesthetic was infiltrated and then an incision was made about 2 cm above the superior border of the clavicle and parallel to it starting from the sternocleidomastoid medially and coursing about 6 cm laterally.  The platysma was divided with cautery and subplatysmal flaps were raised.  The fat pad was mobilized laterally.  Self-retaining retractors were placed.  The phrenic nerve was mobilized  off of the anterior scalene muscle and reflected medially.  The anterior scalene muscle was divided distally and a segment of the muscle was removed.  The brachial plexus was identified and mobilized off of the middle scalene muscle.  I then carefully dissected around the middle scalene muscle to identify the trunks of the long thoracic nerve which were carefully preserved.  A segment of the middle scalene muscle was removed.  Working between the brachial plexus medially and the long thoracic nerve laterally I was able to identify the surface of the first rib and began clearing the surface of the rib off using periosteal elevators.  This allowed me to clear off the exposed surface of the rib as well as the medial and lateral borders coursing far anteriorly and also along to the posterior aspect of the rib.  The undersurface of the rib was cleared off using a right angle carefully  the underlying soft tissue and pleura from the rib.  The rib was then divided anteriorly and posteriorly with a  and small burs were removed with a rongeur.  The wound was irrigated and then a lap sponge was carefully placed into the wound.    At this point attention was turned to performing the thrombectomy.  Using the existing 4 Jordanian sheath in the left brachial vein I was able to pass an angled Glidewire up into the left subclavian vein however it was stuck near the junction with the innominate vein.  I was able to negotiate the wire into the innominate vein using catheter support.  The wire was advanced down into the inferior vena cava.  The sheath was exchanged for a long 12 [place]ian sheath which was advanced up into the axillary vein.  I then performed extensive thrombectomy with a 12 [place]ian lightening catheter removing a significant amount of acute and subacute thrombus.  Subsequent venogram showed that all visible traces of thrombus have been removed and there was a chronic stenosis in the vein near the junction  with the innominate, consistent with chronic extrinsic compression from the thoracic outlet syndrome.  This was initially treated with an 8 mm balloon inflation then followed by a 10 mm plain balloon inflation with over 3-minute inflation time.  While the balloon was inflated I was able to explore the open wound used for rib resection and I found the course of the subclavian vein and was able to remove fibrous tissue from around the exterior surface of the vein allowing for additional expansion of the subclavian vein.  The balloon was then removed and venogram was repeated showing excellent recanalization of the axillary and subclavian veins with no significant residual stenosis and good flow into the innominate vein.  The 12 Egyptian sheath was removed and the site was closed with a 3-0 nylon suture and a dry sterile bandage was placed.    This point the supraclavicular incision was reinspected.  There was no evidence of bleeding or lymphatic drainage.  Topical hemostatic was applied and then the fat pad was replaced with interrupted Vicryl sutures.  The platysma was reapproximated with running 2-0 Vicryl suture.  The skin was reapproximated with interrupted 3-0 Vicryl sutures and then a running subcuticular 4-0 strata fix suture followed by skin glue.  A sling was placed to support the arm.    The patient was then sent to recovery in stable condition.  All counts were correct at the conclusion of the case.    Postoperative Plan:    Patient will be maintained on heparin infusion to maximize patency of the subclavian vein and then transition to Eliquis on approximately postoperative day 1 or 2.  Patient will be on Eliquis for 3 months postoperatively.      Álvaro Chamberlain MD  [facility]

## 2024-02-03 NOTE — PROGRESS NOTES
Report received from RN, assumed care at 0645  Pt is A0X4, and responds appropriately   Pt declines any SOB, chest pain, new onset of numbness/ tingling  Pt rates pain at 3/10, on a scale of 1-10, pt declined PRN pain medication at this time.  Pt is voiding adequately and without hesitancy  Pt does not flatus, hypoactive bowel sounds,  BM on 1/31  Pt ambulates with a standby assist   Pt is not tolerating a regular diet, pt has when eating nausea/without vomiting  Plan of care discussed, all questions answered. Explained importance of calling before getting OOB and pt verbalizes understanding. Explained importance of oral care. Call light is within reach, treaded slipper socks on, bed in lowest/ locked position, hourly rounding in place, all needs met at this time

## 2024-02-03 NOTE — PROGRESS NOTES
"       VASCULAR SURGERY SERVICE                        Progress Note  _________________________________    No concerns overnight, pain controlled, tolerated some PO today  Hurts to move the left shoulder/arm - within expectations    /59   Pulse 85   Temp 36.6 °C (97.9 °F) (Temporal)   Resp 16   Ht 1.651 m (5' 5\")   Wt 68.9 kg (151 lb 14.4 oz)   SpO2 97%   BMI 25.28 kg/m²     Motor function intact throughout the LUE  Sensation intact throughout the LUE  Left neck incision CDI, no swelling, no hematoma, no drainage  LUE wrapped with ace, mild edema  LUE well perfused  Sling in place for comfort    Hgb 14  Creatinine normal    - Doing well  - Continue heparin gtt for now since he is early postop, will transition to Eliquis tomorrow  - Will review postop instructions regarding stretches/exercises tomorrow  - Likely home tomorrow if feeling up to it  - Vascular service taking over as primary    Álvaro Chamberlain MD  [facility]   Voalte preferred or call my office [phone]  __________________________________________________  Patient:Ha Storey   MRN:[id number]       "

## 2024-02-03 NOTE — ANESTHESIA PROCEDURE NOTES
Airway    Date/Time: 2/2/2024 3:49 PM    Performed by: Gaby Batista M.D.  Authorized by: Gaby Batista M.D.    Location:  OR  Urgency:  Elective  Indications for Airway Management:  Anesthesia      Spontaneous Ventilation: absent    Sedation Level:  Deep  Preoxygenated: Yes    Patient Position:  Sniffing  Final Airway Type:  Endotracheal airway  Final Endotracheal Airway:  ETT  Cuffed: Yes    Technique Used for Successful ETT Placement:  Direct laryngoscopy    Insertion Site:  Oral  Blade Type:  Maribeth  Laryngoscope Blade/Videolaryngoscope Blade Size:  3  ETT Size (mm):  7.0  Measured from:  Teeth  ETT to Teeth (cm):  22  Placement Verified by: auscultation and capnometry    Cormack-Lehane Classification:  Grade IIa - partial view of glottis  Number of Attempts at Approach:  1

## 2024-02-03 NOTE — CARE PLAN
The patient is Stable - Low risk of patient condition declining or worsening    Shift Goals  Clinical Goals: monitor XA, pain control,CTA  Patient Goals: rest    Progress made toward(s) clinical / shift goals:        Problem: Risk for Bleeding  Goal: Patient will take measures to prevent bleeding and recognizes signs of bleeding that need to be reported immediately to a health care professional  Outcome: Progressing       Problem: Respiratory  Goal: Patient will achieve/maintain optimum respiratory ventilation and gas exchange  Outcome: Progressing  Flowsheets (Taken 2/2/2024 6021)  O2 Delivery Device: None - Room Air  Note: 1. Assess and monitor rate, rhythm, depth and effort of respiration 2. Breath sounds assessed qshift and/or as needed 3. Assess O2 saturation, administer/titrate oxygen as ordered 4. Position patient for maximum ventilatory efficiency 5. Turn, cough, and deep breath with splinting to improve effectiveness 6. Collaborate with RT to administer medication/treatments per order 7. Encourage use of incentive spirometer and encourage patient to cough after use and utilize splinting techniques if applicable 8. Airway suctioning 9. Monitor sputum production for changes in color, consistency and frequency 10. Perform frequent oral hygiene 11. Alternate physical activity with rest periods        Patient is not progressing towards the following goals:  NA

## 2024-02-03 NOTE — PROGRESS NOTES
Report received from PACU RN; assumed care once arrived to floor. Mother arrived shortly thereafter. Assessment/2 RN skin check completed. A&O x 3-4, mildly disoriented to time. Loss of time reported. VSS. 97% on 1L NC, titrated to RA, nausea reported. Replaced. Patient denying SOB, numbness, tingling, vomiting. Intermittent nausea/dizziness reported. Medicated for pain/nausea; see MAR.  LUE in sling, dressing CDI; placed upon pillow for offloading/support. + CMS intact. Abdomen round. Hypoactive BS x 4 noted. + void; yellow urine noted in urinal. - eructation. - flatus. LBM PTA, 3 days ago, reported not eating much. Patient not tolerating regular diet, ice chips encouraged. Noted fluid ingestion. Patient reported pain with movement of neck, not ready to get out of bed. IV heparin in place, not therapeutic; restarted protocol upon arrival to floor. Discussed plan of care with patient/mother. All questions answered.  High fall risk. Bed's frame engaged. Bed in locked/lowest position.  Call light/personal belongings within reach.  All needs met throughout shift.

## 2024-02-03 NOTE — OR SURGEON
Immediate Post OP Note    PreOp Diagnosis: Venous thoracic outlet syndrome, acute left subclavian DVT      PostOp Diagnosis: Venous thoracic outlet syndrome, acute left subclavian DVT      Procedure(s):  EXCISION, FIRST RIB - Wound Class: Clean  THROMBECTOMY - Wound Class: Clean  VENOGRAM, LEFT UPPER EXTREMITY - Wound Class: Clean    Surgeon(s):  Álvaro Chamberlain M.D.    Anesthesiologist/Type of Anesthesia:  Anesthesiologist: Gaby Batista M.D./General    Surgical Staff:  Circulator: Elvira Jaquez R.N.; Jesus Cook R.N.; Priscilla Campbell R.N.  Scrub Person: Fidel Lucas; Tamiko Arana  Radiology Technologist: Ian Norwood; Sharmin Brock; Jemal Dyer; Pierre Castillo; Taty Peguero    Specimens removed if any:  ID Type Source Tests Collected by Time Destination   A : LEFT FIRST RIB Tissue Chest GROSS ONLY REQUEST Álvaro Chamberlain M.D. 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 DVT completely resolved with percutaneous 12fr thrombectomy and the stenosis completely resolved with 10mm PBA    Complications: none        2/2/2024 7:27 PM Álvaro Chamberlain M.D.

## 2024-02-03 NOTE — PROGRESS NOTES
2 RN skin check complete.     Devices in place: NC, PIV x 2 right hand, right forearm.  Skin assessed under devices: NA    Left neck/supraclavicular incision approximated with dermabond. Left arm with ace bandage enwrapping other surgical site. Sling in place. + CMS check. X 2 PIV to right hand/right forearm; CDI. No other skin demarcations/issues noted/reported.     Confirmed pressure ulcers found on: NA.  New potential pressure ulcers noted on: NA.   Wound consult placed: NA.  The following interventions in place: Pillows for limb support/offloading.

## 2024-02-03 NOTE — ANESTHESIA TIME REPORT
Anesthesia Start and Stop Event Times       Date Time Event    2/2/2024 1523 Ready for Procedure     1539 Anesthesia Start     1937 Anesthesia Stop          Responsible Staff  02/02/24      Name Role Begin End    Gaby Batista M.D. Anesth 1539 1937          Overtime Reason:  no overtime (within assigned shift)    Comments:

## 2024-02-04 PROCEDURE — A9270 NON-COVERED ITEM OR SERVICE: HCPCS | Performed by: SURGERY

## 2024-02-04 PROCEDURE — 770001 HCHG ROOM/CARE - MED/SURG/GYN PRIV*

## 2024-02-04 PROCEDURE — 700102 HCHG RX REV CODE 250 W/ 637 OVERRIDE(OP): Performed by: SURGERY

## 2024-02-04 PROCEDURE — 700111 HCHG RX REV CODE 636 W/ 250 OVERRIDE (IP): Performed by: SURGERY

## 2024-02-04 RX ADMIN — ACETAMINOPHEN 1000 MG: 500 TABLET ORAL at 06:07

## 2024-02-04 RX ADMIN — ACETAMINOPHEN 1000 MG: 500 TABLET ORAL at 21:05

## 2024-02-04 RX ADMIN — OXYCODONE HYDROCHLORIDE 10 MG: 10 TABLET ORAL at 21:12

## 2024-02-04 RX ADMIN — KETOROLAC TROMETHAMINE 15 MG: 15 INJECTION, SOLUTION INTRAMUSCULAR; INTRAVENOUS at 13:21

## 2024-02-04 RX ADMIN — APIXABAN 5 MG: 5 TABLET, FILM COATED ORAL at 17:32

## 2024-02-04 RX ADMIN — KETOROLAC TROMETHAMINE 15 MG: 15 INJECTION, SOLUTION INTRAMUSCULAR; INTRAVENOUS at 17:35

## 2024-02-04 RX ADMIN — OXYCODONE 5 MG: 5 TABLET ORAL at 06:10

## 2024-02-04 RX ADMIN — KETOROLAC TROMETHAMINE 15 MG: 15 INJECTION, SOLUTION INTRAMUSCULAR; INTRAVENOUS at 06:05

## 2024-02-04 RX ADMIN — KETOROLAC TROMETHAMINE 15 MG: 15 INJECTION, SOLUTION INTRAMUSCULAR; INTRAVENOUS at 00:30

## 2024-02-04 ASSESSMENT — PAIN DESCRIPTION - PAIN TYPE
TYPE: SURGICAL PAIN
TYPE: ACUTE PAIN;SURGICAL PAIN
TYPE: SURGICAL PAIN
TYPE: SURGICAL PAIN
TYPE: ACUTE PAIN;SURGICAL PAIN
TYPE: SURGICAL PAIN
TYPE: ACUTE PAIN;SURGICAL PAIN
TYPE: SURGICAL PAIN
TYPE: SURGICAL PAIN
TYPE: ACUTE PAIN;SURGICAL PAIN
TYPE: ACUTE PAIN;SURGICAL PAIN
TYPE: SURGICAL PAIN

## 2024-02-04 NOTE — PROGRESS NOTES
VASCULAR SURGERY SERVICE  _________________________________    DISCHARGE INSTRUCTIONS    Procedure: First Rib Resection with Venous Thrombectomy    1. ACTIVITIES: No strenuous activities or lifting greater than 20 pounds for 6 weeks after surgery. Things to avoid:    Avoid for 1 week  - Avoid lifting items heavier than a cup of coffee  - Avoid shoulder flexion (lifting your arm up in front of you) or abduction (lifting your arm and elbow up and out to the side) above 90 degrees, for example, reaching to get something from shoulder height  - Avoid external & internal rotation of shoulder outside of neutral position, for example, reaching behind you.    Avoid for at least six weeks  - Avoid extension combined with internal rotation, for example, tucking shirts in, using the operative arm for toileting or trying to do a bra up behind the body.  - Avoid extension beyond neutral, for example, pushing up on the operated arm with the body forward on getting out of a chair. Make sure you can always ‘see’ your elbow (rest the elbow on a pillow when lying on your back)    Avoid for at least eight weeks  - Extremes of external rotation and also abduction, for example, reaching back and out to get a seat belt.    Avoid for at least 12 weeks  - Supporting of body weight through the arm, for example, pushing up out of a chair.    There are many helpful websites to discuss stretches/exercises after surgery.  One of the better ones is:  https://www.esneft.nhs.uk/leaflet/ocijyxafwqthf-gpwcmm-yfihyfuav-1st-cervical-rib-excision-and-thoracic-outlet-syndrome-[facility]/    2. DRIVING: You may drive whenever you are off pain medications and are able to perform the activities needed to drive, i.e. turning, bending, twisting, etc.     3. WOUND: It is not unusual for patients to experience swelling and even bruising, as well as a small amount of drainage from the incision. This is normal and will resolve over the next  few days.     4. ICE: you may place ice on the wound to decrease the swelling for the first 7 days and then discontinue. Apply ice for 20 minutes and then remove for 20 minutes, alternating while awake.    5. BATHING: OK to shower starting one day after surgery.  The incision is covered with skin glue which is waterproof.  It will start to peel off in 5-7 days which is normal.  Avoid submerging the incision in water (tub, bath, pool) for one week. You can remove the bandage on your arm in 24 hours.    6. PAIN MEDICATION: You may be given a prescription for pain medication at discharge. Please take these as directed. It is important to remember not to take medications on an empty stomach as this may cause nausea.     7. NEW MEDICATIONS: You will be sent home on a blood thinner to help prevent more clots from forming.  Plan to take this for 3 months unless instructed otherwise.    8. BOWEL FUNCTION: After surgery, it is not uncommon for patients to experience constipation. This is due to decreasing activity levels as well as pain medications. You may wish to use a stool softener beginning immediately after surgery, and you may or may not need to use a laxative (Milk of Magnesia, Ex-lax; Senokot, etc.) as well.     9 .CALL IF YOU HAVE: (1) Fevers to more than 101.0 F, (2) Unusual or excessive pain, (3) Drainage or fluid from incision that may be foul smelling, increased tenderness or soreness at the wound or the wound edges are no longer together, redness or swelling at the incision site. Please do not hesitate to call with any other questions.     10. APPOINTMENT: Contact our office at [phone] for a follow-up appointment approximately 2 weeks following your procedure.     If you have any additional questions, please do not hesitate to call the office and speak to your surgeon or the physician on call.     Office address:   [facility]  [facility] [address]  [place] [id number] [phone]  Fax  [phone]

## 2024-02-04 NOTE — DISCHARGE INSTRUCTIONS
https://www.esneft.nhs.uk/leaflet/omnhqsrkvfift-hvlhvw-stkqdfqbf-1st-cervical-rib-excision-and-thoracic-outlet-syndrome-[facility]/      Things to avoid:    Avoid for 1 week  - Avoid lifting items heavier than a cup of coffee  - Avoid shoulder flexion (lifting your arm up in front of you) or abduction (lifting your arm and elbow up and out to the side) above 90 degrees, for example, reaching to get something from shoulder height  - Avoid external & internal rotation of shoulder outside of neutral position, for example, reaching behind you.    Avoid for at least six weeks  - Avoid extension combined with internal rotation, for example, tucking shirts in, using the operative arm for toileting or trying to do a bra up behind the body.  - Avoid extension beyond neutral, for example, pushing up on the operated arm with the body forward on getting out of a chair. Make sure you can always ‘see’ your elbow (rest the elbow on a pillow when lying on your back)    Avoid for at least eight weeks  - Extremes of external rotation and also abduction, for example, reaching back and out to get a seat belt.    Avoid for at least 12 weeks  - Supporting of body weight through the arm, for example, pushing up out of a chair.      FOLLOW-UP:  Please call our office at [phone] to make a postoperative appointment in 2 week(s)     DISCHARGE INSTRUCTIONS:  Resume preadmission diet.  Light activity for 4-6 weeks.  OK to shower and get incisions wet.  Call or go to ER if you have chest pain, shortness of breath, lightheadedness, stroke-like symptoms, fever, worsening pain, or any other concerns.     Office address:   [facility]  [facility] [address]  [place] [address]  [phone]  Fax [phone]

## 2024-02-04 NOTE — PROGRESS NOTES
Bedside report received.  Assessment complete.  A&O x 4. Patient calls appropriately.  Patient ambulates with standby assist.    Patient has 4/10 pain.Declines interventions at this time.   Denies N&V. Tolerating diet.  Surgical incision above left clavicle is well approximated with dermabond and AVREY.  Left upper extremity dressing, CDI.   + void,  Patient denies SOB.  Patient pleasant with staff and sitting up in bed.  Review plan with of care with patient. Call light and personal belongings within reach. Hourly rounding in place. All needs met at this time.

## 2024-02-04 NOTE — CARE PLAN
The patient is Stable - Low risk of patient condition declining or worsening    Shift Goals  Clinical Goals: IV heparin, pain control, mobilize, rest  Patient Goals: Pain control, rest  Family Goals: Get better    Progress made toward(s) clinical / shift goals:  Marked improvement with mobility noted over shift. Ambulating frequently with hallway walking after voiding. Medicated for pain; see MAR. IV heparin therapeutic. Patient reported feeling hungry. Patient slept intermittently during shift.     Patient is not progressing towards the following goals: NA.

## 2024-02-04 NOTE — PROGRESS NOTES
Report received from AM RN; assumed care. Assessment completed. A&O x 4. VSS. 97% on 1L NC, desaturation noted after pain medication/while sleeping.Patient denying SOB, tingling, nausea, vomiting, dizziness. Numbness reported to left chest wall/elbow. Medicated for pain; see MAR.  LUE in sling for most of shift, removed after changing to pants. Left neck incision well approximated with dermabond; CDI. LUE enwrapped with ace bandage; CDI. + CMS intact.. Abdomen round. Normoactive BS x 4 noted. + void;  + eructation. +flatus. LBM PTA. Patient reported started to feel hungry. Food brought in by friends/family. IV heparin in running; therapeutic. Encouraged increased hydration/ambulation. Noted SBA around unit after each time voided like encouraged. Discussed plan of care with patient/mother/friends. All questions answered. Low fall risk. Bed in locked/lowest position.  Call light/personal belongings within reach.  All needs met throughout shift.

## 2024-02-05 ENCOUNTER — PHARMACY VISIT (OUTPATIENT)
Dept: PHARMACY | Facility: MEDICAL CENTER | Age: 24
End: 2024-02-05
Payer: COMMERCIAL

## 2024-02-05 VITALS
SYSTOLIC BLOOD PRESSURE: 119 MMHG | BODY MASS INDEX: 25.31 KG/M2 | WEIGHT: 151.9 LBS | HEART RATE: 70 BPM | TEMPERATURE: 97.9 F | HEIGHT: 65 IN | OXYGEN SATURATION: 96 % | RESPIRATION RATE: 16 BRPM | DIASTOLIC BLOOD PRESSURE: 69 MMHG

## 2024-02-05 PROBLEM — I87.1 VENOUS THORACIC OUTLET SYNDROME OF LEFT SUBCLAVIAN VEIN: Status: ACTIVE | Noted: 2024-02-05

## 2024-02-05 PROBLEM — I82.A12 DVT OF LEFT AXILLARY VEIN, ACUTE (HCC): Status: RESOLVED | Noted: 2024-01-31 | Resolved: 2024-02-05

## 2024-02-05 PROBLEM — I87.1 VENOUS THORACIC OUTLET SYNDROME OF LEFT SUBCLAVIAN VEIN: Status: RESOLVED | Noted: 2024-02-05 | Resolved: 2024-02-05

## 2024-02-05 LAB
F2 C.20210G>A GENO BLD/T: NEGATIVE
F5 P.R506Q BLD/T QL: NEGATIVE
PATHOLOGY CONSULT NOTE: NORMAL

## 2024-02-05 PROCEDURE — 700111 HCHG RX REV CODE 636 W/ 250 OVERRIDE (IP): Performed by: SURGERY

## 2024-02-05 PROCEDURE — 700102 HCHG RX REV CODE 250 W/ 637 OVERRIDE(OP): Performed by: SURGERY

## 2024-02-05 PROCEDURE — A9270 NON-COVERED ITEM OR SERVICE: HCPCS | Performed by: SURGERY

## 2024-02-05 PROCEDURE — RXMED WILLOW AMBULATORY MEDICATION CHARGE: Performed by: SURGERY

## 2024-02-05 RX ORDER — HYDROCODONE BITARTRATE AND ACETAMINOPHEN 5; 325 MG/1; MG/1
1-2 TABLET ORAL EVERY 6 HOURS PRN
Qty: 15 TABLET | Refills: 0 | Status: SHIPPED | OUTPATIENT
Start: 2024-02-05 | End: 2024-02-08

## 2024-02-05 RX ADMIN — ONDANSETRON 4 MG: 2 INJECTION INTRAMUSCULAR; INTRAVENOUS at 12:41

## 2024-02-05 RX ADMIN — KETOROLAC TROMETHAMINE 15 MG: 15 INJECTION, SOLUTION INTRAMUSCULAR; INTRAVENOUS at 05:47

## 2024-02-05 RX ADMIN — APIXABAN 5 MG: 5 TABLET, FILM COATED ORAL at 05:49

## 2024-02-05 RX ADMIN — KETOROLAC TROMETHAMINE 15 MG: 15 INJECTION, SOLUTION INTRAMUSCULAR; INTRAVENOUS at 00:19

## 2024-02-05 RX ADMIN — OXYCODONE HYDROCHLORIDE 10 MG: 10 TABLET ORAL at 05:49

## 2024-02-05 ASSESSMENT — PAIN DESCRIPTION - PAIN TYPE
TYPE: ACUTE PAIN;SURGICAL PAIN

## 2024-02-05 NOTE — PROGRESS NOTES
Report received from AM RN; assumed care. Assessment completed. A&O x 4. VSS. 96%, placed on 1L NC, d/t oxycodone administration. Patient denying SOB, tingling, nausea, vomiting, dizziness. Numbness reported to left chest wall, intermittent numbness to left hand. Medicated for pain; see MAR. Noted guarding of left shoulder and increased mobility, improved after medicating for pain. Left neck incision well approximated with dermabond; CDI. LUE ace bandage removed, gauze/tegaderm; CDI. + CMS intact. Abdomen round. Normoactive BS x 4 noted. + void;  + eructation. +flatus. LBM 02/04. Patient reporting improved appetite. Patient up self, sitting up with friends watching switch/ambulating in hallway unassisted. Discussed plan of care/potential discharge. All questions answered. No fall risk. Bed in locked/lowest position.  Call light/personal belongings within reach.  All needs met at present time.

## 2024-02-05 NOTE — CARE PLAN
The patient is Stable - Low risk of patient condition declining or worsening    Shift Goals  Clinical Goals: Pain control, mobility, rest  Patient Goals: Pain control, discharge  Family Goals: Get better    Progress made toward(s) clinical / shift goals:  Medicated for pain; see MAR. Patient up self, steady gait noted. Expected limited ROM with LUE. Patient stated discussed with surgeon and understands post surgical expectations. Patient slept most of shift.     Patient is not progressing towards the following goals: NA.

## 2024-02-05 NOTE — ANESTHESIA POSTPROCEDURE EVALUATION
Patient: Ha Storey    Procedure Summary       Date: 02/02/24 Room / Location: [place] [address] / SURGERY [facility]    Anesthesia Start: 1539 Anesthesia Stop: 1937    Procedures:       EXCISION, FIRST RIB (Left: Chest)      THROMBECTOMY (Left: Arm Upper)      VENOGRAM, LEFT UPPER EXTREMITY (Left: Arm Upper) Diagnosis: (THORACIC OUTET SYNDROME, LEFT SUBCLAVIAN DEEP VEIN THROMBOSIS)    Surgeons: Álvaro Chamberlain M.D. Responsible Provider: Gaby Batista M.D.    Anesthesia Type: general ASA Status: 2            Final Anesthesia Type: general  Last vitals  BP   Blood Pressure: 119/69    Temp   36.6 °C (97.9 °F)    Pulse   70   Resp   16    SpO2   96 %      Anesthesia Post Evaluation    Patient location during evaluation: PACU  Patient participation: complete - patient participated  Level of consciousness: awake and alert    Airway patency: patent  Anesthetic complications: no  Cardiovascular status: hemodynamically stable  Respiratory status: acceptable  Hydration status: euvolemic    PONV: none          No notable events documented.     Nurse Pain Score: 4 (NPRS)

## 2024-02-05 NOTE — DISCHARGE SUMMARY
VASCULAR SURGERY SERVICE  _________________________________    DISCHARGE SUMMARY    Ha Storey  [id number]  [id number]  _____________________________________    DATE OF ADMISSION: 1/31/2024    DATE OF DISCHARGE: 2/5/24    ADMISSION DIAGNOSIS (ES):  DVT of left axillary vein, acute (HCC) [I82.A12]    DISCHARGE DIAGNOSIS (ES):  DVT of left axillary vein, acute (HCC) [I82.A12]    DISCHARGE CONDITION:  stable    CONSULTATIONS:  none    PROCEDURES:  1/31/2024  Left first rib resection and left subclavian vein thrombectomy    HOSPITAL COURSE:  Ha Storey is a 23 y.o. male who presented with 1 week of worsening left upper extremity pain and swelling.  Patient was found to have an acute left subclavian vein DVT consistent with venous thoracic outlet syndrome (Paget-Schroetter syndrome).  Patient underwent the above-mentioned procedure without complication and was admitted to [facility] postoperatively. He had an uneventful recovery and was discharged intact on postoperative day #3. Patient will be on eliquis for 3 months to reduce risk of rethrombosis.    MEDICATIONS:  Current Outpatient Medications   Medication Sig Dispense Refill    apixaban (ELIQUIS) 5mg Tab Take 1 Tablet by mouth 2 times a day. 180 Tablet 1    HYDROcodone-acetaminophen (NORCO) 5-325 MG Tab per tablet Take 1-2 Tablets by mouth every 6 hours as needed (as needed for pain) for up to 3 days. 15 Tablet 0       FOLLOW-UP:  Please call our office at [phone] to make a postoperative appointment in 2 week(s)    DISCHARGE INSTRUCTIONS:  Resume preadmission diet.  Light activity for 4-6 weeks.  OK to shower and get incisions wet.  Call or go to ER if you have chest pain, shortness of breath, lightheadedness, stroke-like symptoms, fever, worsening pain, or any other concerns.    Office address:   [facility] Vascular Surgery  [facility] [address]  [place] [address]  [phone]  Fax [phone]

## 2024-02-05 NOTE — PROGRESS NOTES
Bedside report received.  Assessment complete.  A&O x 4. Patient calls appropriately.  Patient ambulates with standby assist.    Patient has 4/10 pain.Declines interventions at this time.   Denies N&V. Tolerating diet.  Surgical incision above left clavicle is well approximated with dermabond and AVERY.  Left upper extremity Incision AVERY.   + void, +BM  Patient denies SOB.  Patient pleasant with staff and sitting up in bed.  Review plan with of care with patient. Call light and personal belongings within reach. Hourly rounding in place. All needs met at this time.

## 2024-02-05 NOTE — PROGRESS NOTES
VASCULAR SURGERY SERVICE                        Progress Note  _________________________________    Doing well  Home today on eliquis for 3 months  Follow-up 2 weeks, patient instructed to call to schedule  Detailed postop instructions provided    Álvaro Chamberlain MD  [facility] Vascular Surgery   Voalte preferred or call my office [phone]  __________________________________________________  Patient:Ha Storey   MRN:[id number]

## 2024-02-05 NOTE — PROGRESS NOTES
D/c instructions given, educated on worsening s/s. Pt understands and questions answered. D/c home with parents.

## 2024-02-06 ENCOUNTER — TELEPHONE (OUTPATIENT)
Dept: VASCULAR LAB | Facility: MEDICAL CENTER | Age: 24
End: 2024-02-06
Payer: COMMERCIAL

## 2024-02-06 LAB
APTT IMM NP PPP: 45 SEC (ref 32–48)
CONFIRM APTT STACLOT: NEGATIVE
DRVVT SCREEN TO CONFIRM RATIO: NEGATIVE RATIO
LA 3 SCREEN W REFLEX-IMP: ABNORMAL
LA NT PLATELET PPP: ABNORMAL S
MIXING DRVVT: 45 SEC (ref 33–44)
PROTHROMBIN TIME: 13.2 SEC (ref 12–15.5)
PT P HEP NEUT PPP: ABNORMAL SEC (ref 32–48)
REPTILASE TIME: ABNORMAL SEC
SCREEN APTT: 73 SEC (ref 32–48)
SCREEN DRVVT: 52 SEC (ref 33–44)
THROMBIN TIME: 17.2 SEC (ref 14.7–19.5)

## 2024-02-06 NOTE — TELEPHONE ENCOUNTER
[facility] Heart and Vascular Health and Pharmacotherapy Programs     Received anticoagulation referral from inpatient hospital team on 1/31.     Called patient to schedule an appt to establish care. No answer. LVM.    1st call     Insurance: [employer]  PCP: None  Locations to be seen: [facility]    If no response by 2/29 OR 2 no shows/cancellations, will remove from referral list     [facility] Anticoagulation/Pharmacotherapy Clinic at [phone], fax [phone]    Mary Lamb, PharmD

## 2024-02-12 ENCOUNTER — ANTICOAGULATION VISIT (OUTPATIENT)
Dept: VASCULAR LAB | Facility: MEDICAL CENTER | Age: 24
End: 2024-02-12
Attending: INTERNAL MEDICINE
Payer: COMMERCIAL

## 2024-02-12 ENCOUNTER — DOCUMENTATION (OUTPATIENT)
Dept: VASCULAR LAB | Facility: MEDICAL CENTER | Age: 24
End: 2024-02-12

## 2024-02-12 DIAGNOSIS — I82.A12 ACUTE DEEP VEIN THROMBOSIS (DVT) OF AXILLARY VEIN OF LEFT UPPER EXTREMITY (HCC): ICD-10-CM

## 2024-02-12 PROCEDURE — 99213 OFFICE O/P EST LOW 20 MIN: CPT

## 2024-02-12 NOTE — PROGRESS NOTES
NEW DOAC   .  Anticoagulation Summary  As of 2/12/2024      INR goal:     TTR:  --   INR used for dosing:  No new INR was available at the time of this encounter.   Warfarin maintenance plan:  No maintenance plan   Next INR check:  3/13/2024   Target end date:  5/5/2024    Indications    DVT of left axillary vein  acute (HCC) (Resolved) [I82.A12]  Venous thoracic outlet syndrome of left subclavian vein (Resolved) [I87.1]                 Anticoagulation Episode Summary       INR check location:      Preferred lab:      Send INR reminders to:      Comments:            Anticoagulation Patient Findings      PCP: Pcp Pt States None  Cardiologist: None  Dx: DVT  CHADSVASC = n/a  HAS-BLED = 0  Target End Date = 5/5/24 - Vascular Surgery note from Dr Chamberlain on 2/5/24 states 3 months    Pt Hx: Pt presented to the hospital on 1/31/24 after noticing LUE swelling 4 days prior. Per d/c summary: Patient was found to have an acute left subclavian vein DVT consistent with venous thoracic outlet syndrome (Paget-Schroetter syndrome). Pt underwent thrombectomy. Pt denies any recent surgery/trauma or any significant events leading up to his VTE. This is his first unprovoked VTE. He reports his paternal aunt and grandfather have hx of VTE. His hypercoag labs were wnl.     Labs:  Lab Results   Component Value Date/Time    WBC 11.7 (H) 02/03/2024 03:36 AM    RBC 4.91 02/03/2024 03:36 AM    HEMOGLOBIN 14.2 02/03/2024 03:36 AM    HEMATOCRIT 41.5 (L) 02/03/2024 03:36 AM    MCV 84.5 02/03/2024 03:36 AM    MCH 28.9 02/03/2024 03:36 AM    MCHC 34.2 02/03/2024 03:36 AM    MPV 9.1 02/03/2024 03:36 AM    NEUTSPOLYS 75.60 (H) 01/31/2024 05:43 PM    LYMPHOCYTES 15.20 (L) 01/31/2024 05:43 PM    MONOCYTES 7.10 01/31/2024 05:43 PM    EOSINOPHILS 1.30 01/31/2024 05:43 PM    BASOPHILS 0.50 01/31/2024 05:43 PM      Lab Results   Component Value Date/Time    SODIUM 135 02/03/2024 03:36 AM    POTASSIUM 4.6 02/03/2024 03:36 AM    CHLORIDE 100 02/03/2024  03:36 AM    CO2 21 02/03/2024 03:36 AM    GLUCOSE 163 (H) 02/03/2024 03:36 AM    BUN 15 02/03/2024 03:36 AM    CREATININE 1.13 02/03/2024 03:36 AM        Pt is new to apixaban (Eliquis) and new to [facility]. Discussed:   Indication for DOAC therapy.  Importance of monitoring and compliance.   Monitoring parameters, signs and symptoms of bleeding or clotting.  DOAC therapy, side effects, potential DDIs, OTC medications  Hormonal therapy: No  Pregnancy:  n/a  DDI: No  Pt is not on antiplatelet/NSAID therapy  Lifestyle safety, ie smoking, ETOH, hobby safety, fall safety/prevention  Procedures for missed doses or suspected missed doses, surgeries/procedures, travel, dental work, any medication changes    Take Eliquis 5 mg twice daily    DOAC affordable: pending insurance - was able to get 1 month supply via [employer] program    Samples provided: no    F/U: 4 week(s)    Mary Lamb, PharmD    Added [facility] Anticoagulation Services to care team   Send to [facility] Pharmacotherapy Program Consent                                             Name    Ha Fidencio Storey    MRN number: [id number]    the following are guidelines for participation in the [facility] Pharmacotherapy Program.     I, ____Ha Storey_____, understand and voluntarily agree to participate in the [facility] Pharmacotherapy Program and to have services provided to me by pharmacists working in collaboration with my provider.    I understand the pharmacist within the [facility] Pharmacotherapy Program may initiate, modify or discontinue my medications, order appropriate testing and appointments, perform exams, monitor treatment, and make clinical evaluations and decisions pursuant to a collaborative practice agreement with my provider.  I understand the pharmacist within the [facility] Pharmacotherapy Program is not a physician, osteopathic physician, advanced practice registered nurse or physician assistant and may not  diagnose.  I will take all my medications as instructed and not change the way I take it without first talking to my provider or a pharmacist within the [facility] Pharmacotherapy Program.  I understand that if I am late to my appointment I may not be able to be seen by a pharmacist at that time and will have to reschedule my appointment.  During appointment with pharmacist I understand that pharmacist has the right not to answer questions or perform services outside the pharmacist’s scope of practice.  By signing below, I provide informed consent for the pharmacist to provide these services and for my participation in the [facility] Pharmacotherapy Program.      Ha Storey           [id number]          02/12/24  Patient Name                   MRN number  Date     ____Obtained verbal consent from Ha Storey

## 2024-02-13 NOTE — PROGRESS NOTES
Initial anticoagulation clinic note and most recent DC summary reviewed  Patient presented with upper extremity DVT consistent with vTOS.  Now status post thrombectomy and rib resection    Pending further recommendations, we will continue 3 months of oral anticoagulation as recommended by vascular surgery    Defer further vascular follow-up, including any indicated surveillance imaging to vascular surgery unless otherwise requested    Michael J. [facility], MD  Anticoagulation Center

## 2024-02-20 ENCOUNTER — OFFICE VISIT (OUTPATIENT)
Dept: VASCULAR SURGERY | Facility: MEDICAL CENTER | Age: 24
End: 2024-02-20
Payer: COMMERCIAL

## 2024-02-20 VITALS
BODY MASS INDEX: 24.64 KG/M2 | HEART RATE: 83 BPM | TEMPERATURE: 98.5 F | HEIGHT: 65 IN | WEIGHT: 147.9 LBS | OXYGEN SATURATION: 98 % | DIASTOLIC BLOOD PRESSURE: 72 MMHG | SYSTOLIC BLOOD PRESSURE: 112 MMHG

## 2024-02-20 DIAGNOSIS — I87.1 VENOUS THORACIC OUTLET SYNDROME OF LEFT SUBCLAVIAN VEIN: ICD-10-CM

## 2024-02-20 PROCEDURE — 99024 POSTOP FOLLOW-UP VISIT: CPT | Performed by: SURGERY

## 2024-02-20 PROCEDURE — 3074F SYST BP LT 130 MM HG: CPT | Performed by: SURGERY

## 2024-02-20 PROCEDURE — 3078F DIAST BP <80 MM HG: CPT | Performed by: SURGERY

## 2024-02-20 ASSESSMENT — FIBROSIS 4 INDEX: FIB4 SCORE: 0.39

## 2024-02-20 NOTE — PROGRESS NOTES
"                 VASCULAR SURGERY                 Clinic Progress Note  _________________________________    2/2/24 underwent combined left first rib resection via supraclavicular approach and also LUE venogram/thrombectomy/10mm PBA of the left subclavian vein by Dr. Chamberlain    2/20/24 patient returns for his first postoperative check.  He is overall doing remarkably well.  Minimal pain.  Good mobility of the shoulder joint.  Incision is healing well.  Patient has no specific complaints and in fact he is anxious to return to more advanced activities such as weightlifting and playing the trombone, for which I am recommending a minimum of at least 4 weeks but potentially 6 weeks of recovery before we even start to reintroduce these activities.  Patient is on Eliquis 5 mg twice daily which we are planning to continue for 3 months after surgery    Vitals  /72 (BP Location: Left arm, Patient Position: Sitting, BP Cuff Size: Adult)   Pulse 83   Temp 36.9 °C (98.5 °F) (Temporal)   Ht 1.651 m (5' 5\")   Wt 67.1 kg (147 lb 14.4 oz)   SpO2 98%   BMI 24.61 kg/m²     Incision healing well.  No swelling or hematoma.  Excellent mobility of the shoulder joint.  No left upper extremity edema.  Strong palpable left radial pulse.    A/P)  Patient is doing remarkably well.  No specific concerns at this time.  Encouraged him to maintain light activity for the time being and he will follow-up with me in mid-March for clinical progress check or sooner if concerns arise.  Patient will continue taking Eliquis for 3 months postop.      Álvaro Chamberlain MD  [facility] Vascular Surgery Clinic  [phone]  [address], [place] [address]  "

## 2024-03-13 ENCOUNTER — ANTICOAGULATION VISIT (OUTPATIENT)
Dept: VASCULAR LAB | Facility: MEDICAL CENTER | Age: 24
End: 2024-03-13
Attending: INTERNAL MEDICINE
Payer: COMMERCIAL

## 2024-03-13 DIAGNOSIS — Z86.718 HISTORY OF DVT IN ADULTHOOD: ICD-10-CM

## 2024-03-13 PROCEDURE — 99211 OFF/OP EST MAY X REQ PHY/QHP: CPT

## 2024-03-13 NOTE — PROGRESS NOTES
Target end date: 5/5/24  Indication: DVT with vTOS  Drug: Eliquis 5 mg bid  CHADsVASC = n/a  HAS-BLED = 0    Health Status Since Last Assessment  Pt denies any new relevant medical problems, ED visits or hospitalizations  Pt denies any embolic events (stroke/tia/systemic embolism)      Adherence with DOAC Therapy  Pt has not missed doses in the average week.    Bleeding Risk Assessment  Pt denies epistaxis  Pt denies any hematuria  Pt denies any excessive or unusual bleeding/hematomas.  Pt denies any GI bleeds or hematemesis.  Pt denies any concerning daily headache or subdural hematoma symptoms.    Latest Hemoglobin: WNL  Hemoglobin   Date Value Ref Range Status   02/03/2024 14.2 14.0 - 18.0 g/dL Final     Latest Platelets: WNL  Platelet Count   Date Value Ref Range Status   02/03/2024 241 164 - 446 K/uL Final          Creatinine Clearance/Renal Function  Latest CrCl: >30 ml/min    Hepatic function  Latest LFTs: WNL  Pt denies any history of liver dysfunction   Pt denies  ETOH overuse     Drug Interactions  ASA/other antiplatelets: None  NSAID: None  Other drug interactions: None  Verified no anticonvulsant or azole therapy, education provided for future use.     Examination  Blood Pressure   There were no vitals filed for this visit.  Symptomatic hypotension: Denies   Significant gait impairment/imbalance/high risk for falls? no    Final Assessment and Recommendations:  Patient appears stable from the anticoagulation standpoint.    Benefits of continued DOAC therapy outweigh risks for this patient  Recommend pt continue with current DOAC therapy: Eliquis 5 mg bid    DOAC is affordable    Follow up:  Will follow up with patient 2 month(s) for LOT appt.     Mary Lamb, FarhanD

## 2024-03-14 ENCOUNTER — OFFICE VISIT (OUTPATIENT)
Dept: VASCULAR SURGERY | Facility: MEDICAL CENTER | Age: 24
End: 2024-03-14
Payer: COMMERCIAL

## 2024-03-14 VITALS
WEIGHT: 147 LBS | SYSTOLIC BLOOD PRESSURE: 112 MMHG | BODY MASS INDEX: 24.49 KG/M2 | TEMPERATURE: 98.1 F | DIASTOLIC BLOOD PRESSURE: 62 MMHG | OXYGEN SATURATION: 98 % | HEIGHT: 65 IN | HEART RATE: 63 BPM

## 2024-03-14 DIAGNOSIS — I87.1 VENOUS THORACIC OUTLET SYNDROME OF LEFT SUBCLAVIAN VEIN: ICD-10-CM

## 2024-03-14 PROCEDURE — 99024 POSTOP FOLLOW-UP VISIT: CPT | Performed by: SURGERY

## 2024-03-14 PROCEDURE — 3078F DIAST BP <80 MM HG: CPT | Performed by: SURGERY

## 2024-03-14 PROCEDURE — 3074F SYST BP LT 130 MM HG: CPT | Performed by: SURGERY

## 2024-03-14 ASSESSMENT — FIBROSIS 4 INDEX: FIB4 SCORE: 0.41

## 2024-03-14 NOTE — PROGRESS NOTES
"                 VASCULAR SURGERY                 Clinic Progress Note  _________________________________    2/2/24 underwent combined left first rib resection via supraclavicular approach and also LUE venogram/thrombectomy/10mm PBA of the left subclavian vein by Dr. Chamberlain     2/20/24 patient returns for his first postoperative check.  He is overall doing remarkably well.  Minimal pain.  Good mobility of the shoulder joint.  Incision is healing well.  Patient has no specific complaints and in fact he is anxious to return to more advanced activities such as weightlifting and playing the trombone, for which I am recommending a minimum of at least 4 weeks but potentially 6 weeks of recovery before we even start to reintroduce these activities.  Patient is on Eliquis 5 mg twice daily which we are planning to continue for 3 months after surgery     3/14/24 ***    Vitals  /62 (BP Location: Left arm, Patient Position: Sitting, BP Cuff Size: Adult)   Pulse 63   Temp 36.7 °C (98.1 °F) (Temporal)   Ht 1.651 m (5' 5\")   Wt 66.7 kg (147 lb)   SpO2 98%   BMI 24.46 kg/m²      Incision healing well.  No swelling or hematoma.  Excellent mobility of the shoulder joint.  No left upper extremity edema.  Strong palpable left radial pulse.     A/P)  Patient is doing remarkably well.  No specific concerns at this time.  Encouraged him to maintain light activity for the time being and he will follow-up with me in mid-March for clinical progress check or sooner if concerns arise.  Patient will continue taking Eliquis for 3 months postop.          Álvaro Chamberlain MD  [facility] Vascular Surgery Clinic  [phone]  [address], [place] [address]  "

## 2024-04-22 NOTE — PROGRESS NOTES
VASCULAR MEDICINE CLINIC - INITIAL VISIT (ANTICOAGULATION)  05/01/24    Sees Dr Chamberlain 4/30/24    Ha Storey is a 24 y.o. male who presents today for LOT.     Subjective    HPI:  Patient referred for evaluation and management of anticoagulation therapy.  He was found to have a LUE subclavian DVT on 1/31/24 in the setting of vTOS.  Had left 1st rib resection done by Dr Chamberlain on 2/2/24.    No prior hx of VTEs.  No known FH.  Denies any prior surgeries, hospitalizations, medical illnesses, traumas, extended travel or other prolonged periods of immobility.  No recent covid infections ***  No tobacco or hormone use ***  Denies any personal hx of malignancy ***  Started on Eliquis 10 mg BID x 7 days then 5 mg BID.  Started on Xarelto 15 mg BID x 21 days then 20 mg daily.  Has been adherent to taking.  Cost is affordable ***  No problems with bleeding ***  Avoids NSAIDs/ASA ***  ETOH ***  No SOB or CP ***  No LE swelling or pain ***  Resumed usual activities ***  Sedentary ***  Exercise ***  BMI ***  Has completed 3 mo of therapy.    No past medical history on file.     Past Surgical History:   Procedure Laterality Date    RIB RESECTION Left 2/2/2024    Procedure: EXCISION, FIRST RIB;  Surgeon: Álvaro Chamberlain M.D.;  Location: SURGERY [facility];  Service: Vascular    THROMBECTOMY Left 2/2/2024    Procedure: THROMBECTOMY;  Surgeon: Álvaro Chamberlain M.D.;  Location: SURGERY [facility];  Service: Vascular    CAVAL VENOGRAPHY Left 2/2/2024    Procedure: VENOGRAM, LEFT UPPER EXTREMITY;  Surgeon: Álvaro Chamberlain M.D.;  Location: SURGERY [facility];  Service: Vascular        No family history on file.     Social History     Tobacco Use    Smoking status: Never    Smokeless tobacco: Never        Current Outpatient Medications on File Prior to Visit   Medication Sig Dispense Refill    apixaban (ELIQUIS) 5mg Tab Take 1 Tablet by mouth 2 times a day. 180 Tablet 1     No current facility-administered  "medications on file prior to visit.        Patient has no known allergies.     DIET AND EXERCISE:  Weight Change:***  Diet: {DIET TYPES:42358}  Exercise: {EXERCISE PATTERN:21}     ROS         Objective       Objective:     There were no vitals filed for this visit.     Physical Exam     DATA REVIEW    No results found for: \"CHOLSTRLTOT\", \"LDL\", \"HDL\", \"TRIGLYCERIDE\"    Lab Results   Component Value Date/Time    SODIUM 135 02/03/2024 03:36 AM    POTASSIUM 4.6 02/03/2024 03:36 AM    CHLORIDE 100 02/03/2024 03:36 AM    CO2 21 02/03/2024 03:36 AM    GLUCOSE 163 (H) 02/03/2024 03:36 AM    BUN 15 02/03/2024 03:36 AM    CREATININE 1.13 02/03/2024 03:36 AM     Lab Results   Component Value Date/Time    ALKPHOSPHAT 66 01/31/2024 05:43 PM    ASTSGOT 17 01/31/2024 05:43 PM    ALTSGPT 17 01/31/2024 05:43 PM    TBILIRUBIN 0.5 01/31/2024 05:43 PM       INR   Date Value Ref Range Status   02/02/2024 1.15 (H) 0.87 - 1.13 Final     Comment:     INR - Non-therapeutic Reference Range: 0.87-1.13  INR - Therapeutic Reference Range: 2.0-4.0       No results found for: \"POCINR\"     1/31/24 LUE venous duplex:  1.  Extensive LEFT upper extremity DVT.  2.  Thrombosis of the basilic vein consistent with SVT.    2/1/24 CTA chest:  1.  Negative for thrombus within the left innominate vein, left internal jugular vein and no definite thrombus identified in the left subclavian vein.     2.  Edema adjacent to the left axillary vein possibly indicating thrombus     3.  Patent arterial structures     4.  Mild right basilar atelectasis    Medical Decision Making:  Today's Assessment / Status / Plan:     No diagnosis found.     Indication for anticoagulation: ***    Anti-Platelet/Anticoagulant Discussion:  ***    Anti-Coagulation Plan:  ***    Smoking: continue complete avoidance of all tobacco products    Physical Activity: ***    Weight Management and Nutrition: ***    Instructed to follow-up with PCP for remainder of adult medical needs: " {YES/NO:63}  We will partner with other provider in the management of established vascular disease and cardiometabolic risk factors    Studies to Be Obtained: ***  Labs to Be Obtained: ***    Follow up in: {FOLLOWUP:29812}    [facility] EXAM 4     Cc:       '

## 2024-04-30 ENCOUNTER — OFFICE VISIT (OUTPATIENT)
Dept: VASCULAR SURGERY | Facility: MEDICAL CENTER | Age: 24
End: 2024-04-30
Payer: COMMERCIAL

## 2024-04-30 VITALS
WEIGHT: 147 LBS | DIASTOLIC BLOOD PRESSURE: 66 MMHG | HEART RATE: 71 BPM | BODY MASS INDEX: 24.49 KG/M2 | SYSTOLIC BLOOD PRESSURE: 102 MMHG | OXYGEN SATURATION: 96 % | HEIGHT: 65 IN | TEMPERATURE: 98.2 F

## 2024-04-30 DIAGNOSIS — I87.1 VENOUS THORACIC OUTLET SYNDROME OF LEFT SUBCLAVIAN VEIN: ICD-10-CM

## 2024-04-30 PROCEDURE — 3074F SYST BP LT 130 MM HG: CPT | Performed by: SURGERY

## 2024-04-30 PROCEDURE — 3078F DIAST BP <80 MM HG: CPT | Performed by: SURGERY

## 2024-04-30 PROCEDURE — 99024 POSTOP FOLLOW-UP VISIT: CPT | Performed by: SURGERY

## 2024-04-30 ASSESSMENT — FIBROSIS 4 INDEX: FIB4 SCORE: 0.41

## 2024-05-01 ENCOUNTER — ANTICOAGULATION MONITORING (OUTPATIENT)
Dept: VASCULAR LAB | Facility: MEDICAL CENTER | Age: 24
End: 2024-05-01

## 2024-05-01 ENCOUNTER — APPOINTMENT (OUTPATIENT)
Dept: VASCULAR LAB | Facility: MEDICAL CENTER | Age: 24
End: 2024-05-01
Attending: INTERNAL MEDICINE
Payer: COMMERCIAL

## 2024-05-01 NOTE — PROGRESS NOTES
Patient scheduled for LOT visit today. Reviewed chart. He has already seen vascular surgery and was instructed to stop anticoagulation therapy 3 months after left first rib resection and left subclavian vein thrombectomy done by Dr. Chamberlain on 2/2/24. Pt aware he can stop Eliquis ~5/2/24. Will discharge from anticoagulation clinic.    Chantel SELF

## 2024-05-07 NOTE — PROGRESS NOTES
"                 VASCULAR SURGERY                 Clinic Progress Note  _________________________________    Vitals for Today's Visit (5/7/2024)  /66 (BP Location: Left arm, Patient Position: Sitting, BP Cuff Size: Adult)   Pulse 71   Temp 36.8 °C (98.2 °F) (Temporal)   Ht 1.651 m (5' 5\")   Wt 66.7 kg (147 lb)   SpO2 96%   BMI 24.46 kg/m²   _________________________________    2/2/24 underwent combined left first rib resection via supraclavicular approach and also LUE venogram/thrombectomy/10mm PBA of the left subclavian vein by Dr. Chamberlain     2/20/24 patient returns for his first postoperative check.  He is overall doing remarkably well.  Minimal pain.  Good mobility of the shoulder joint.  Incision is healing well.  Patient has no specific complaints and in fact he is anxious to return to more advanced activities such as weightlifting and playing the trombone, for which I am recommending a minimum of at least 4 weeks but potentially 6 weeks of recovery before we even start to reintroduce these activities.  Patient is on Eliquis 5 mg twice daily which we are planning to continue for 3 months after surgery     3/14/24 patient continues to recover well.  Feeling well.  No specific concerns. Incision healing well.  No swelling or hematoma.  Excellent mobility of the shoulder joint.  No left upper extremity edema.  Strong palpable left radial pulse.  Patient is doing remarkably well.  No specific concerns at this time.  Encouraged him to maintain light activity for the time being and he will follow-up with me in mid-March for clinical progress check or sooner if concerns arise.  Patient will continue taking Eliquis for 3 months postop.     4/30/24 doing well.  Patient is back to weight lifting.  No significant complaints at all.  Arm is functioning well.  Left incision is well-healed.  Strong palpable left radial pulse.  No concerns at this time.  Patient can follow-up with us as needed if new concerns " arise.  Patient has completed 3 months of Eliquis and I am not planning to keep him on it any longer.        Álvaro Chamberlain MD  [facility] Vascular Surgery Clinic  [phone]  [address], [place] [address]

## 2024-11-12 ENCOUNTER — HOSPITAL ENCOUNTER (OUTPATIENT)
Dept: RADIOLOGY | Facility: MEDICAL CENTER | Age: 24
End: 2024-11-12
Attending: PHYSICIAN ASSISTANT
Payer: COMMERCIAL

## 2024-11-12 ENCOUNTER — OFFICE VISIT (OUTPATIENT)
Dept: URGENT CARE | Facility: CLINIC | Age: 24
End: 2024-11-12
Payer: COMMERCIAL

## 2024-11-12 VITALS
BODY MASS INDEX: 26.08 KG/M2 | WEIGHT: 156.5 LBS | HEART RATE: 69 BPM | SYSTOLIC BLOOD PRESSURE: 114 MMHG | OXYGEN SATURATION: 100 % | HEIGHT: 65 IN | DIASTOLIC BLOOD PRESSURE: 66 MMHG | TEMPERATURE: 97.6 F | RESPIRATION RATE: 13 BRPM

## 2024-11-12 DIAGNOSIS — M79.602 BILATERAL ARM PAIN: ICD-10-CM

## 2024-11-12 DIAGNOSIS — Z86.69 HISTORY OF THORACIC OUTLET SYNDROME: ICD-10-CM

## 2024-11-12 DIAGNOSIS — M79.601 BILATERAL ARM PAIN: ICD-10-CM

## 2024-11-12 PROCEDURE — 93970 EXTREMITY STUDY: CPT

## 2024-11-12 PROCEDURE — 99214 OFFICE O/P EST MOD 30 MIN: CPT | Performed by: PHYSICIAN ASSISTANT

## 2024-11-12 PROCEDURE — 3078F DIAST BP <80 MM HG: CPT | Performed by: PHYSICIAN ASSISTANT

## 2024-11-12 PROCEDURE — 3074F SYST BP LT 130 MM HG: CPT | Performed by: PHYSICIAN ASSISTANT

## 2024-11-12 ASSESSMENT — FIBROSIS 4 INDEX: FIB4 SCORE: 0.41

## 2024-11-12 NOTE — PROGRESS NOTES
"Subjective     Ha Storey is a 24 y.o. male who presents with Other (Patient states that earlier in the year he had surgery for a blood clot. Patient states recently he has been having weird in his hands in his blood blow. Patient states that he gets cold and hot flashes. Patient states a compression feeling in chest and head.)            Other        ROS           Objective     /66   Pulse 69   Temp 36.4 °C (97.6 °F) (Temporal)   Resp 13   Ht 1.651 m (5' 5\")   Wt 71 kg (156 lb 8 oz)   SpO2 100%   BMI 26.04 kg/m²      Physical Exam                        Assessment & Plan        Assessment & Plan                  "

## 2024-11-12 NOTE — LETTER
November 12, 2024         Patient: Ha Storey   YOB: 2000   Date of Visit: 11/12/2024           To Whom it May Concern:    Ha Storey was seen in my clinic on 11/12/2024. Please excuse any absences from work this week due to acute condition.        If you have any questions or concerns, please don't hesitate to call.        Sincerely,           Pollo Collier P.A.-C.  Electronically Signed

## 2024-11-14 NOTE — PROGRESS NOTES
"Subjective     Ha Storey is a 24 y.o. male who presents with Other (Patient states that earlier in the year he had surgery for a blood clot. Patient states recently he has been having weird in his hands in his blood blow. Patient states that he gets cold and hot flashes. Patient states a compression feeling in chest and head.)            HPI  Patient presenting with bilateral hot flashes in his arms with some tingling in his hands.  Patient had thoracic outlet syndrome diagnosed on 1/31/2024 with subsequent surgery on 2/2/2024.  He was placed on 3-month Eliquis regimen and stopped in April.  Since then he has been asymptomatic with no concerns.      PMH:  has no past medical history on file.  MEDS:   Current Outpatient Medications:     apixaban (ELIQUIS) 5mg Tab, Take 1 Tablet by mouth 2 times a day., Disp: 180 Tablet, Rfl: 1  ALLERGIES: No Known Allergies  SURGHX:   Past Surgical History:   Procedure Laterality Date    RIB RESECTION Left 2/2/2024    Procedure: EXCISION, FIRST RIB;  Surgeon: Álvaro Chamberlain M.D.;  Location: SURGERY [facility];  Service: Vascular    THROMBECTOMY Left 2/2/2024    Procedure: THROMBECTOMY;  Surgeon: Álvaro Chamberlain M.D.;  Location: SURGERY [facility];  Service: Vascular    CAVAL VENOGRAPHY Left 2/2/2024    Procedure: VENOGRAM, LEFT UPPER EXTREMITY;  Surgeon: Álvaro Chamberlain M.D.;  Location: SURGERY [facility];  Service: Vascular     SOCHX:  reports that he has never smoked. He has never used smokeless tobacco.  FH: family history is not on file.      Review of Systems   Constitutional: Negative.    Respiratory: Negative.     Cardiovascular: Negative.    Gastrointestinal: Negative.    Neurological: Negative.        Medications, Allergies, and current problem list reviewed today in Epic             Objective     /66   Pulse 69   Temp 36.4 °C (97.6 °F) (Temporal)   Resp 13   Ht 1.651 m (5' 5\")   Wt 71 kg (156 lb 8 oz)   SpO2 100%   BMI 26.04 kg/m²  "     Physical Exam  Vitals and nursing note reviewed.   Constitutional:       General: He is not in acute distress.     Appearance: Normal appearance. He is well-developed. He is not diaphoretic.   HENT:      Head: Normocephalic.      Right Ear: Hearing and external ear normal.      Left Ear: Hearing and external ear normal.      Nose: Nose normal.      Mouth/Throat:      Mouth: Mucous membranes are moist.   Eyes:      General:         Right eye: No discharge.         Left eye: No discharge.      Conjunctiva/sclera: Conjunctivae normal.   Neck:      Vascular: No JVD.   Cardiovascular:      Rate and Rhythm: Normal rate and regular rhythm.      Pulses: Normal pulses.      Heart sounds: Normal heart sounds.   Pulmonary:      Effort: Pulmonary effort is normal. No respiratory distress.      Breath sounds: Normal breath sounds. No stridor. No wheezing, rhonchi or rales.   Musculoskeletal:      Cervical back: Full passive range of motion without pain, normal range of motion and neck supple.      Comments: No bilateral upper extremity swelling, discoloration, tenderness.  Ulnar and radial pulses equal bilaterally.  Sensory and strength intact.  There is no mass, deformity, or engorgement/cording of the bilateral extremities with specific attention to the axillary and chest/shoulder.  Full passive and range of motion neck without JVD.   Skin:     General: Skin is warm and dry.      Coloration: Skin is not pale.      Findings: No erythema.   Neurological:      General: No focal deficit present.      Mental Status: He is alert and oriented to person, place, and time. Mental status is at baseline.      Sensory: No sensory deficit.      Motor: No weakness.   Psychiatric:         Mood and Affect: Mood normal.         Behavior: Behavior normal.         Thought Content: Thought content normal.         Judgment: Judgment normal.                               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      No superficial or deep venous thrombosis in RUE.      EDGAR MCDONOUGH      Exam Date:     2024 16:23      Room #:     Out Patient      Priority:     Stat      Ht (in):             Wt (lb):      Ordering Physician:        YEN JJ      Referring Physician:       [id number]      Sonographer:               Judah Sanchez RVT, RDMS      Study Type:                Complete Bilateral      Technical Quality:         Adequate      Age:    24    Gender:     M      MRN:    [id number]      :    2000      BSA:      Indications:     Thrombosis      CPT Codes:       01974      ICD Codes:       451      History:         History of left-side thrombosis. Prior study 2024.      Limitations:      PROCEDURES:   Bilateral upper extremity venous duplex imaging.    The following venous structures were evaluated: internal jugular,    subclavian, axillary, brachial, cephalic, and basilic veins.    Serial compression, color, and spectral Doppler flow evaluations were    performed.          FINDINGS:   Right upper extremity.    All veins demonstrate complete color filling and compressibility with normal venous flow dynamics including spontaneous flow and respiratory phasicity.    No superficial or deep venous thrombosis.       Left upper extremity.    Chronic, partially occlusive thrombus in the subclavian vein.   All other veins demonstrate complete color filling and compressibility with normal venous flow dynamics including spontaneous flow and respiratory phasicity.       Assessment & Plan        Assessment & Plan  Bilateral arm pain  This is a very pleasant 24-year-old male presenting with complaints of bilateral arm pain.  He had thoracic outlet syndrome in January with subsequent surgery in February.  He completed 3 months of Eliquis and was cleared in April.  Since that time he has been  asymptomatic and healthy.  Over the last few days he has felt some strange sensations in his bilateral arms.  Thankfully, ultrasound shows no new/acute thrombus.  The chronic partially occlusive thrombus of the subclavian vein is again demonstrated.  His vital signs are stable and his exam is very reassuring.  He has no red flag symptoms.  I did recommend starting a 324 aspirin until he can discuss current symptoms with vascular specialty.  ER and red flag symptoms discussed at length with patient.    Orders:    US-EXTREMITY VENOUS UPPER BILAT; Future    History of thoracic outlet syndrome    Orders:    US-EXTREMITY VENOUS UPPER BILAT; Future          I personally reviewed prior external notes and test results pertinent to today's visit. Return to clinic or go to ED if symptoms worsen or persist. Red flag symptoms and indications for ED discussed at length. Patient/Parent/Guardian voices understanding.  AVS with post-visit instructions printed and provided or given verbally.  Follow-up with your primary care provider in 3-5 days. All side effects and potential interactions of prescribed medication discussed including allergic response, GI upset, tendon injury, rash, sedation, OCP effectiveness, etc.    Please note that this dictation was created using voice recognition software. I have made every reasonable attempt to correct obvious errors, but I expect that there are errors of grammar and possibly content that I did not discover before finalizing the note.

## 2024-11-24 ASSESSMENT — ENCOUNTER SYMPTOMS
RESPIRATORY NEGATIVE: 1
NEUROLOGICAL NEGATIVE: 1
CARDIOVASCULAR NEGATIVE: 1
CONSTITUTIONAL NEGATIVE: 1
GASTROINTESTINAL NEGATIVE: 1

## 2024-12-12 ENCOUNTER — OFFICE VISIT (OUTPATIENT)
Dept: VASCULAR SURGERY | Facility: MEDICAL CENTER | Age: 24
End: 2024-12-12
Payer: COMMERCIAL

## 2024-12-12 VITALS
WEIGHT: 155 LBS | HEART RATE: 75 BPM | BODY MASS INDEX: 25.83 KG/M2 | OXYGEN SATURATION: 97 % | TEMPERATURE: 99 F | SYSTOLIC BLOOD PRESSURE: 102 MMHG | DIASTOLIC BLOOD PRESSURE: 66 MMHG | HEIGHT: 65 IN

## 2024-12-12 DIAGNOSIS — I87.1 VENOUS THORACIC OUTLET SYNDROME OF LEFT SUBCLAVIAN VEIN: ICD-10-CM

## 2024-12-12 PROCEDURE — 3078F DIAST BP <80 MM HG: CPT | Performed by: SURGERY

## 2024-12-12 PROCEDURE — 3074F SYST BP LT 130 MM HG: CPT | Performed by: SURGERY

## 2024-12-12 PROCEDURE — 99213 OFFICE O/P EST LOW 20 MIN: CPT | Performed by: SURGERY

## 2024-12-12 ASSESSMENT — FIBROSIS 4 INDEX: FIB4 SCORE: 0.41

## 2024-12-12 NOTE — PROGRESS NOTES
"                 VASCULAR SURGERY                 Clinic Progress Note  _________________________________    Vitals for Today's Visit (12/12/2024)  /66 (BP Location: Left arm, Patient Position: Sitting, BP Cuff Size: Adult)   Pulse 75   Temp 37.2 °C (99 °F) (Temporal)   Ht 1.651 m (5' 5\")   Wt 70.3 kg (155 lb)   SpO2 97%   BMI 25.79 kg/m²   _________________________________    2/2/24 underwent combined left first rib resection via supraclavicular approach and also LUE venogram/thrombectomy/10mm PBA of the left subclavian vein by Dr. Chamberlain     2/20/24 patient returns for his first postoperative check.  He is overall doing remarkably well.  Minimal pain.  Good mobility of the shoulder joint.  Incision is healing well.  Patient has no specific complaints and in fact he is anxious to return to more advanced activities such as weightlifting and playing the trombone, for which I am recommending a minimum of at least 4 weeks but potentially 6 weeks of recovery before we even start to reintroduce these activities.  Patient is on Eliquis 5 mg twice daily which we are planning to continue for 3 months after surgery     3/14/24 patient continues to recover well.  Feeling well.  No specific concerns. Incision healing well.  No swelling or hematoma.  Excellent mobility of the shoulder joint.  No left upper extremity edema.  Strong palpable left radial pulse.  Patient is doing remarkably well.  No specific concerns at this time.  Encouraged him to maintain light activity for the time being and he will follow-up with me in mid-March for clinical progress check or sooner if concerns arise.  Patient will continue taking Eliquis for 3 months postop.     4/30/24 doing well.  Patient is back to weight lifting.  No significant complaints at all.  Arm is functioning well.  Left incision is well-healed.  Strong palpable left radial pulse.  No concerns at this time.  Patient can follow-up with us as needed if new concerns " arise.  Patient has completed 3 months of Eliquis and I am not planning to keep him on it any longer.         12/12/24  Patient continues to do well after his left first rib resection.  He is back to all of his normal activities with no restrictions including weight lifting.  He did recently have an episode of chest pain, not entirely sure why, but one of the physicians that evaluated and was concerned that it could potentially be recurrent symptoms of his venous thoracic outlet syndrome and so duplex was obtained.  This showed a small amount of chronic nonocclusive thrombus in his left subclavian vein which is within expectations given his history of venous thoracic outlet syndrome.  There was no evidence of acute thrombus and the subclavian vein remains patent.  Patient is doing well from a surgical standpoint.  No specific concerns.  He can follow-up with me on an as-needed basis if new concerns arise.      Álvaro Chamberlain MD  [facility]  [phone]  [address], [place] [address]

## (undated) DEVICE — Device

## (undated) DEVICE — CLIP LG INTNL HRZN TI ESCP LGT - (20/BX)

## (undated) DEVICE — SYRINGE NON SAFETY 5 CC 20 GA X 1-1/2 IN (100/BX 4BX/CA)

## (undated) DEVICE — GLOVE BIOGEL SZ 7.5 SURGICAL PF LTX - (50PR/BX 4BX/CA)

## (undated) DEVICE — SENSOR OXIMETER ADULT SPO2 RD SET (20EA/BX)

## (undated) DEVICE — SYRINGE 10 ML CONTROL LL (25EA/BX 4BX/CA)

## (undated) DEVICE — SUTURE 2-0 VICRYL PLUS CT-1 36 (36PK/BX)"

## (undated) DEVICE — BLANKET WARMING LOWER BODY (10EA/CA)

## (undated) DEVICE — GLIDECATH ANGLE 4F X 70CM (5EA/BX)

## (undated) DEVICE — SLEEVE VASO CALF MED - (10PR/CA)

## (undated) DEVICE — ELECTRODE DUAL RETURN W/ CORD - (50/PK)

## (undated) DEVICE — SUTURE 3-0 ETHILON FS-1 - (36/BX) 30 INCH

## (undated) DEVICE — GLOVE BIOGEL PI INDICATOR SZ 7.5 SURGICAL PF LF -(50/BX 4BX/CA)

## (undated) DEVICE — SUTURE 0 VICRYL PLUS CT-1 - 36 INCH (36/BX)

## (undated) DEVICE — COVER LIGHT HANDLE ALC PLUS DISP (18EA/BX)

## (undated) DEVICE — BANDAGE ELASTIC 6 HONEYCOMB - 6X5YD LF (20/CA)"

## (undated) DEVICE — SUTURE 6-0 PROLENE C-1 D/A 24 (36PK/BX)"

## (undated) DEVICE — SET LEADWIRE 5 LEAD BEDSIDE DISPOSABLE ECG (1SET OF 5/EA)

## (undated) DEVICE — LACTATED RINGERS INJ 1000 ML - (14EA/CA 60CA/PF)

## (undated) DEVICE — DRAPE SURGICAL U 77X120 - (10/CA)

## (undated) DEVICE — SPONGE KITTNER DISSECTORS - (5EA/PK 50PK/CA)

## (undated) DEVICE — SET INTRO MIRCROPUNCTURE - MPIS-501-SST

## (undated) DEVICE — GLOVE BIOGEL SZ 7 SURGICAL PF LTX - (50PR/BX 4BX/CA)

## (undated) DEVICE — SUTURE GENERAL

## (undated) DEVICE — SURGIFOAM (SIZE 100) - (6EA/CA)

## (undated) DEVICE — GLOVE SZ 7.5 BIOGEL PI MICRO - PF LF (50PR/BX)

## (undated) DEVICE — SUTURE CV

## (undated) DEVICE — SUTURE ETHILON 2-0 FSLX 30 (36PK/BX)"

## (undated) DEVICE — SHEATH RO 4F 6CM (10EA/BX)

## (undated) DEVICE — TRAY CATHETER FOLEY URINE METER W/STATLOCK 350ML (10EA/CA)

## (undated) DEVICE — VESSELOOP MINI BLUE STERILE - SURG-I-LOOP (10EA/BX)

## (undated) DEVICE — PACK AV FISTULA (4EA/CA)

## (undated) DEVICE — CLIP MED LG INTNL HRZN TI ESCP - (20/BX)

## (undated) DEVICE — DRAPE LARGE 3 QUARTER - (20/CA)

## (undated) DEVICE — SHEATH INTRODUCER PERFORMER 12F X 30CM

## (undated) DEVICE — TOWELS CLOTH SURGICAL - (4/PK 20PK/CA)

## (undated) DEVICE — DECANTER FLD BLS - (50/CA)

## (undated) DEVICE — SODIUM CHL. INJ. 0.9% 500ML (24EA/CA 50CA/PF)

## (undated) DEVICE — DRAPE U ORTHOPEDIC - (10/BX)

## (undated) DEVICE — SET EXTENSION WITH 2 PORTS (48EA/CA) ***PART #2C8610 IS A SUBSTITUTE*****

## (undated) DEVICE — SUCTION TIP STRAIGHT ARGYLE - 50EA/CA

## (undated) DEVICE — DEVICE INFLATION DIGITAL BLUE DIAMOND (5EA/BX)

## (undated) DEVICE — CLIP MED INTNL HRZN TI ESCP - (25/BX)

## (undated) DEVICE — COVER PROBE INTRAOPERATIVE KIT (10EA/CA)

## (undated) DEVICE — SUCTION INSTRUMENT YANKAUER BULBOUS TIP W/O VENT (50EA/CA)

## (undated) DEVICE — GUIDEWIRE 25CM .35 180CM ANGLED GLIDEWIRE ADVANTAGE (1/BX)

## (undated) DEVICE — STAPLER SKIN DISP - (6/BX 10BX/CA) VISISTAT

## (undated) DEVICE — CANISTER SUCTION 3000ML MECHANICAL FILTER AUTO SHUTOFF MEDI-VAC NONSTERILE LF DISP  (40EA/CA)

## (undated) DEVICE — GOWN WARMING STANDARD FLEX - (30/CA)

## (undated) DEVICE — KIT SURGIFLO W/OUT THROMBIN - (6EA/CA)

## (undated) DEVICE — BAG SPONGE COUNT 10.25 X 32 - BLUE (250/CA)

## (undated) DEVICE — GLOVE COTTON STERILE (50/CA)

## (undated) DEVICE — GLOVE BIOGEL PI INDICATOR SZ 8.0 SURGICAL PF LF -(50/BX 4BX/CA)

## (undated) DEVICE — CONTAINER SPECIMEN BAG OR - STERILE 4 OZ W/LID (100EA/CA)

## (undated) DEVICE — DRESSING TRANSPARENT FILM TEGADERM 4 X 4.75" (50EA/BX)"

## (undated) DEVICE — SYRINGE 30 ML LL (56/BX)

## (undated) DEVICE — BOVIE BLADE COATED - (50/PK)

## (undated) DEVICE — SUTURE 4-0 30CM STRATAFIX SPIRAL PS-2 (12EA/BX)

## (undated) DEVICE — CHLORAPREP 26 ML APPLICATOR - ORANGE TINT(25/CA)

## (undated) DEVICE — SYRINGE 20 ML LL (50EA/BX 4BX/CA)

## (undated) DEVICE — PAD LAP STERILE 18 X 18 - (5/PK 40PK/CA)

## (undated) DEVICE — DRAPE C-ARM LARGE 41IN X 74 IN - (10/BX 2BX/CA)

## (undated) DEVICE — GOWN SURGEONS X-LARGE - DISP. (30/CA)

## (undated) DEVICE — SPONGE GAUZESTER 4 X 4 4PLY - (128PK/CA)

## (undated) DEVICE — GLOVE SIZE 7.0 SURGEON ACCELERATOR FREE GREEN (50PR/BX 4BX/CA)

## (undated) DEVICE — CLIP SM INTNL HRZN TI ESCP LGT - (24EA/PK 25PK/BX)

## (undated) DEVICE — TUBING CLEARLINK DUO-VENT - C-FLO (48EA/CA)

## (undated) DEVICE — DERMABOND ADVANCED - (12EA/BX)

## (undated) DEVICE — SUTURE 3-0 SILK 12 X 18 IN - (36/BX)

## (undated) DEVICE — TUBE E-T HI-LO CUFF 7.0MM (10EA/PK)

## (undated) DEVICE — VESSELOOP MAXI BLUE STERILE- SURG-I-LOOP (10EA/BX)

## (undated) DEVICE — SUTURE 3-0 VICRYL PLUS SH - 8X 18 INCH (12/BX)

## (undated) DEVICE — SUTURE 5-0 PROLENE C-1 D/A 24 (36PK/BX)"

## (undated) DEVICE — BALLOON 8.0X80 75CM MUSTANG